# Patient Record
Sex: FEMALE | Race: WHITE | NOT HISPANIC OR LATINO | Employment: FULL TIME | URBAN - METROPOLITAN AREA
[De-identification: names, ages, dates, MRNs, and addresses within clinical notes are randomized per-mention and may not be internally consistent; named-entity substitution may affect disease eponyms.]

---

## 2019-02-19 ENCOUNTER — INITIAL PRENATAL (OUTPATIENT)
Dept: OBSTETRICS AND GYNECOLOGY | Facility: CLINIC | Age: 19
End: 2019-02-19

## 2019-02-19 VITALS — WEIGHT: 129.4 LBS | DIASTOLIC BLOOD PRESSURE: 74 MMHG | SYSTOLIC BLOOD PRESSURE: 109 MMHG

## 2019-02-19 DIAGNOSIS — Z11.3 SCREEN FOR STD (SEXUALLY TRANSMITTED DISEASE): Primary | ICD-10-CM

## 2019-02-19 DIAGNOSIS — Z02.83 ENCOUNTER FOR DRUG SCREENING: ICD-10-CM

## 2019-02-19 DIAGNOSIS — Z13.228 CYSTIC FIBROSIS SCREENING: ICD-10-CM

## 2019-02-19 DIAGNOSIS — Z34.01 PRIMIGRAVIDA IN FIRST TRIMESTER: ICD-10-CM

## 2019-02-19 DIAGNOSIS — Z11.4 SCREENING FOR HIV (HUMAN IMMUNODEFICIENCY VIRUS): ICD-10-CM

## 2019-02-19 PROBLEM — Z34.00 PRIMIGRAVIDA: Status: ACTIVE | Noted: 2019-02-19

## 2019-02-19 LAB
B-HCG UR QL: POSITIVE
INTERNAL NEGATIVE CONTROL: NEGATIVE
INTERNAL POSITIVE CONTROL: POSITIVE
Lab: ABNORMAL

## 2019-02-19 PROCEDURE — 81025 URINE PREGNANCY TEST: CPT | Performed by: OBSTETRICS & GYNECOLOGY

## 2019-02-19 PROCEDURE — 99204 OFFICE O/P NEW MOD 45 MIN: CPT | Performed by: OBSTETRICS & GYNECOLOGY

## 2019-02-19 RX ORDER — VITAMIN A, ASCORBIC ACID, CHOLECALCIFEROL, TOCOPHEROL, THIAMINE MONONITRATE, RIBOFLAVIN, PYRIDOXINE, FOLIC ACID, CYANOCOBALAMIN, CALCIUM CARBONATE, FERROUS FUMARATE, ZINC OXIDE, CUPRIC OXIDE, NIACINAMIDE, AND FISH OIL 27-1-250MG
KIT ORAL
Refills: 3 | COMMUNITY
Start: 2019-02-13 | End: 2019-12-05

## 2019-02-19 NOTE — PROGRESS NOTES
Chief complaint: Initial prenatal visit  History of present illness: Patient is here for her initial prenatal visit.  She did go to the emergency room at [facility] on 1/13/19.  She reported that she was having some pelvic pain at the time.  She states that she had her first positive pregnancy test on 1/11/19.  She has a very uncertain last menstrual period that was possibly sometime around November 2018.  She reports irregular menses secondary to PCOS.  When she went to [facility], and ultrasound did not show any intrauterine pregnancy or extrauterine pregnancy.  Her hCG was only 550 that day.  She has not seen a gynecologist or been back to the hospital since.  She did see her family medicine doctor 2 times, but no blood work was performed.  The patient reports that she is having some mild nausea and morning sickness.  She is not vomiting much.  She says that she has not had any pelvic pain since leaving the hospital that day.  She denies irregular bleeding.  Patient reports that she quit smoking when she found out that she was pregnant.  She denies any drug or alcohol use since learning of the pregnancy.    History reviewed. No pertinent past medical history.     History reviewed. No pertinent surgical history.     Social History     Tobacco Use   • Smoking status: Former Smoker   • Smokeless tobacco: Never Used   Substance Use Topics   • Alcohol use: No     Frequency: Never   • Drug use: No     Family History   Problem Relation Age of Onset   • Hypertension Paternal Uncle      Current Outpatient Medications on File Prior to Visit   Medication Sig   • Prenatal Vit-Fe Fum-FA-Omega (PNV PRENATAL PLUS MULTIVIT+DHA) 27-1 & 312 MG misc TAKE 1 TAB & 1 CAPSULE BY MOUTH ONE TIME A DAY     No current facility-administered medications on file prior to visit.      No Known Allergies     ROS:  General: No fever or chills  Constitutional: No weight loss or gain, no hair loss  HENT: No headache, no hearing loss, no  tinnitus  Eyes: normal vision, no eye pain  Lungs: No cough, no shortness of breath  Heart: No chest pain, no palpitations  Abdomen: Pos nausea, No vomiting, constipation or diarrhea  : No dysuria, no hematuria  Skin: No rashes  Lymph: No swelling  Neuro: No parathesia, no weakness  Psych: Normal though content, no hallucinations, no SI/HI    PE:  Vitals:    19 0918   BP: 109/74   Weight: 58.7 kg (129 lb 6.4 oz)     See initial physical exam in initial pregnancy flowsheet    Assessment:  1.  18-year-old  1 with an uncertain last menstrual period, I would say roughly 8-9 weeks based on exam today.  2 very mild nausea of pregnancy.    Plan:  1. Initial prenatal counseling performed today. Hospital and call coverage system discussed. Pt is recommended to start PNV. Prenatal care educational handouts provided in after visit summary. OTC medications discussed. Discussed typical schedule of prenatal visits. Obtain US next available for dating. ROutine labs today with cultures, and OB profile panel.  2. Discussed nausea and vomiting in pregnancy. Discussed diet and lifestyle modifications to help prevent nausea. Weight gain expectations discussed with pt.  3.  I explained to the patient that I will be leaving the practice beginning 19.  I offered the patient to either continue to follow-up with me until I move or to switch to another doctor in the practice earlier.  Patient states that she is uncertain at this time.  She reports that she was referred to me by a friend.  I explained that I would be happy to see her for as long as I am still practicing with the group, but realistically I would not be here around the time that she would be delivering.  We will schedule the patient with another provider at her next appointment, if she wishes to stay with me until I move we can get her back on my schedule.

## 2019-02-19 NOTE — PATIENT INSTRUCTIONS
Prenatal Care  WHAT IS PRENATAL CARE?  Prenatal care is the process of caring for a pregnant woman before she gives birth. Prenatal care makes sure that she and her baby remain as healthy as possible throughout pregnancy. Prenatal care may be provided by a midwife, family practice health care provider, or a childbirth and pregnancy specialist (obstetrician). Prenatal care may include physical examinations, testing, treatments, and education on nutrition, lifestyle, and social support services.  WHY IS PRENATAL CARE SO IMPORTANT?  Early and consistent prenatal care increases the chance that you and your baby will remain healthy throughout your pregnancy. This type of care also decreases a baby’s risk of being born too early (prematurely), or being born smaller than expected (small for gestational age). Any underlying medical conditions you may have that could pose a risk during your pregnancy are discussed during prenatal care visits. You will also be monitored regularly for any new conditions that may arise during your pregnancy so they can be treated quickly and effectively.  WHAT HAPPENS DURING PRENATAL CARE VISITS?  Prenatal care visits may include the following:  Discussion  Tell your health care provider about any new signs or symptoms you have experienced since your last visit. These might include:  · Nausea or vomiting.  · Increased or decreased level of energy.  · Difficulty sleeping.  · Back or leg pain.  · Weight changes.  · Frequent urination.  · Shortness of breath with physical activity.  · Changes in your skin, such as the development of a rash or itchiness.  · Vaginal discharge or bleeding.  · Feelings of excitement or nervousness.  · Changes in your baby’s movements.    You may want to write down any questions or topics you want to discuss with your health care provider and bring them with you to your appointment.  Examination  During your first prenatal care visit, you will likely have a complete  physical exam. Your health care provider will often examine your vagina, cervix, and the position of your uterus, as well as check your heart, lungs, and other body systems. As your pregnancy progresses, your health care provider will measure the size of your uterus and your baby’s position inside your uterus. He or she may also examine you for early signs of labor. Your prenatal visits may also include checking your blood pressure and, after about 10-12 weeks of pregnancy, listening to your baby’s heartbeat.  Testing  Regular testing often includes:  · Urinalysis. This checks your urine for glucose, protein, or signs of infection.  · Blood count. This checks the levels of white and red blood cells in your body.  · Tests for sexually transmitted infections (STIs). Testing for STIs at the beginning of pregnancy is routinely done and is required in many states.  · Antibody testing. You will be checked to see if you are immune to certain illnesses, such as rubella, that can affect a developing fetus.  · Glucose screen. Around 24-28 weeks of pregnancy, your blood glucose level will be checked for signs of gestational diabetes. Follow-up tests may be recommended.  · Group B strep. This is a bacteria that is commonly found inside a woman’s vagina. This test will inform your health care provider if you need an antibiotic to reduce the amount of this bacteria in your body prior to labor and childbirth.  · Ultrasound. Many pregnant women undergo an ultrasound screening around 18-20 weeks of pregnancy to evaluate the health of the fetus and check for any developmental abnormalities.  · HIV (human immunodeficiency virus) testing. Early in your pregnancy, you will be screened for HIV. If you are at high risk for HIV, this test may be repeated during your third trimester of pregnancy.    You may be offered other testing based on your age, personal or family medical history, or other factors.  HOW OFTEN SHOULD I PLAN TO SEE MY  HEALTH CARE PROVIDER FOR PRENATAL CARE?  Your prenatal care check-up schedule depends on any medical conditions you have before, or develop during, your pregnancy. If you do not have any underlying medical conditions, you will likely be seen for checkups:  · Monthly, during the first 6 months of pregnancy.  · Twice a month during months 7 and 8 of pregnancy.  · Weekly starting in the 9th month of pregnancy and until delivery.    If you develop signs of early labor or other concerning signs or symptoms, you may need to see your health care provider more often. Ask your health care provider what prenatal care schedule is best for you.  WHAT CAN I DO TO KEEP MYSELF AND MY BABY AS HEALTHY AS POSSIBLE DURING MY PREGNANCY?  · Take a prenatal vitamin containing 400 micrograms (0.4 mg) of folic acid every day. Your health care provider may also ask you to take additional vitamins such as iodine, vitamin D, iron, copper, and zinc.  · Take 1085-6182 mg of calcium daily starting at your 20th week of pregnancy until you deliver your baby.  · Make sure you are up to date on your vaccinations. Unless directed otherwise by your health care provider:  ? You should receive a tetanus, diphtheria, and pertussis (Tdap) vaccination between the 27th and 36th week of your pregnancy, regardless of when your last Tdap immunization occurred. This helps protect your baby from whooping cough (pertussis) after he or she is born.  ? You should receive an annual inactivated influenza vaccine (IIV) to help protect you and your baby from influenza. This can be done at any point during your pregnancy.  · Eat a well-rounded diet that includes:  ? Fresh fruits and vegetables.  ? Lean proteins.  ? Calcium-rich foods such as milk, yogurt, hard cheeses, and dark, leafy greens.  ? Whole grain breads.  · Do not eat seafood high in mercury, including:  ? Swordfish.  ? Tilefish.  ? Shark.  ? Hiram mackerel.  ? More than 6 oz tuna per week.  · Do not  eat:  ? Raw or undercooked meats or eggs.  ? Unpasteurized foods, such as soft cheeses (brie, blue, or feta), juices, and milks.  ? Lunch meats.  ? Hot dogs that have not been heated until they are steaming.  · Drink enough water to keep your urine clear or pale yellow. For many women, this may be 10 or more 8 oz glasses of water each day. Keeping yourself hydrated helps deliver nutrients to your baby and may prevent the start of pre-term uterine contractions.  · Do not use any tobacco products including cigarettes, chewing tobacco, or electronic cigarettes. If you need help quitting, ask your health care provider.  · Do not drink beverages containing alcohol. No safe level of alcohol consumption during pregnancy has been determined.  · Do not use any illegal drugs. These can harm your developing baby or cause a miscarriage.  · Ask your health care provider or pharmacist before taking any prescription or over-the-counter medicines, herbs, or supplements.  · Limit your caffeine intake to no more than 200 mg per day.  · Exercise. Unless told otherwise by your health care provider, try to get 30 minutes of moderate exercise most days of the week. Do not  do high-impact activities, contact sports, or activities with a high risk of falling, such as horseback riding or downhill skiing.  · Get plenty of rest.  · Avoid anything that raises your body temperature, such as hot tubs and saunas.  · If you own a cat, do not empty its litter box. Bacteria contained in cat feces can cause an infection called toxoplasmosis. This can result in serious harm to the fetus.  · Stay away from chemicals such as insecticides, lead, mercury, and cleaning or paint products that contain solvents.  · Do not have any X-rays taken unless medically necessary.  · Take a childbirth and breastfeeding preparation class. Ask your health care provider if you need a referral or recommendation.    This information is not intended to replace advice given  to you by your health care provider. Make sure you discuss any questions you have with your health care provider.  Document Released: 12/20/2004 Document Revised: 05/22/2017 Document Reviewed: 03/04/2015  [employer] Interactive Patient Education © 2017 [employer]    Pregnancy, The Father's Role  A father has an important role during his partner’s pregnancy, labor, delivery, and after the birth of the baby. It is important to help and support your partner through this new period. There are many physical and emotional changes that happen. To be helpful and supportive during this time, you should know and understand what is happening to your partner during pregnancy, labor, delivery, and after the baby is born.  What are the stages of pregnancy?  Pregnancy usually lasts about 40 weeks. The pregnancy is divided into three trimesters.  First Trimester  During the first 13 weeks, your partner may:  · Feel tired.  · Have painful breasts.  · Feel nauseous or throw up.  · Urinate more often.  · Have mood changes.    All of these changes are normal. If they are happening, try to be helpful, supportive, and understanding. This may include helping with household duties and activities and spending more time with each other.  Second Trimester  During the next 14-28 weeks:  · Your partner will likely feel better and more energetic.  · This is the best time of the pregnancy to be more active together.  · You will be able to see her belly showing the pregnancy.  · You may be able to feel the baby kick.  · Your partner may have soreness or aching in her back as she gains weight. You can help her by carrying heavy things and by rubbing her back when she is feeling sore.    Third Trimester  During the final 12 weeks, your partner may:  · Become more uncomfortable as the baby grows.  · Have a hard time doing everyday activities, and her balance may be off.  · Have a hard time bending over.  · Tire easily.  · Have difficulty  sleeping.    At this time, the birth of your baby is close. You and your partner may have concerns or questions. This is normal. Talk with each other and with your health care provider. Continue to help your partner with housework, encourage her to rest, and rub her sore back and legs, if this helps her.  What can I expect or do during the pregnancy?  You can expect to experience some changes. There are also many things you can do to help prepare you and your partner for your baby.  Emotional Changes  During your partner's pregnancy, emotional changes for you may include:  · Having feelings of happiness, excitement, and pride.  · Being concerned about having new responsibilities, such as financial or educational responsibilities.  · Feeling overwhelmed or scared.  · Being worried that a baby will change your relationship with your partner.    These feelings are normal. Talk about them openly with your partner and your health care provider.  Prenatal Care  Attend prenatal care visits with your partner. This is a good time for you to get to know your health care provider, follow the pregnancy, and ask questions.  · Prenatal visits usually occur one time each month for six months, then every two weeks for two months, and then one time each week during the last month. You may have more prenatal visits if your health care provider believes this is needed.  · Your health care provider usually does an ultrasound of the baby at one of the prenatal visits. This may happen more often if your health care provider thinks it is needed.    Sexual Activity  Sexual intercourse is safe unless there is a problem with the pregnancy and your health care provider advises you to not have sexual intercourse. Because physical and emotional changes happen in pregnancy, your partner may not want to have sex during certain times. Trying different positions may make sexual intercourse more comfortable. However, always respect your partner’s  decision if she does not want to have sex. It is important for both of you to discuss your feelings and desires. Talk with your health care provider about any questions that you may have about sexual intercourse during pregnancy.  Childbirth Classes  Attend childbirth classes with your partner if you are able. Classes prepare you and help you to understand what happens during labor and delivery, and they help you and your partner to bond. There are even some classes that are only for new fathers. Classes also teach you and your partner:  · Various relaxation techniques.  · How to work with her labor pains.  · How to focus during labor and delivery.    What should I know about labor and delivery?  Many fathers want to be present while their partner is going through labor and delivery. You may:  · Be asked to time the contractions, massage your partner’s back, and breathe with her during the contractions.  · Get to see and enjoy the excitement of your baby being born, and you may even be able to cut your baby’s umbilical cord. If you feel like you might faint or you are uncomfortable, ask someone to help you.  · Need to leave the room if a problem develops during labor or delivery.    A  delivery, or , is a procedure that may be used to deliver the baby. It is done through an incision in the abdomen and the uterus. A  delivery may be scheduled or it may be an emergency procedure during labor and delivery. Most hospitals allow the father to be in the room for a  delivery unless it is an emergency. Recovery from a  delivery usually requires more help from the father.  What happens after delivery?  After your baby is born, your partner will go through many changes again. These changes could last a few months or longer.  Postpartum Depression  Your partner may take awhile to regain her strength. She may also have feelings of sadness (postpartum blues or postpartum depression). If  your partner is acting unusually sad or depressed, talk with your health care provider right away. This can be a serious medical condition that requires treatment.  Breastfeeding  Your partner may decide to breastfeed the baby. This helps with bonding between the mother and the baby, and breast milk is the best nutrition for your baby. You can feel included by burping the baby and bottle-feeding the baby with breast milk that was collected from the mother. This allows your partner to rest and helps you to bond with your baby.  Sexual Activity  It may take a few months for your partner’s body to heal and be ready for sexual intercourse again. This may take longer after a  delivery. If you have any questions about having sexual intercourse or if it is painful for your partner, talk with your health care provider.  It is possible for breastfeeding mothers to become pregnant even if they are not having menstrual periods. Use birth control (contraception) unless you and your partner would like to become pregnant again.  What should I remember?  Fatherhood and having a baby is an ongoing learning experience. It is common to be anxious, concerned, or afraid that you may not be taking care of your  baby properly. It is important to talk with your partner and your health care provider if you are worried or have any questions.  This information is not intended to replace advice given to you by your health care provider. Make sure you discuss any questions you have with your health care provider.  Document Released: 2009 Document Revised: 2017 Document Reviewed: 2015  [employer] Interactive Patient Education © 2017 [employer]

## 2019-02-20 LAB
ABO GROUP BLD: (no result)
BASOPHILS # BLD AUTO: 0 X10E3/UL (ref 0–0.2)
BASOPHILS NFR BLD AUTO: 0 %
BLD GP AB SCN SERPL QL: (no result)
BLD GP AB SCN SERPL QL: POSITIVE
BLOOD GROUP ANTIBODIES SERPL: ABNORMAL
C TRACH RRNA SPEC QL NAA+PROBE: NEGATIVE
EOSINOPHIL # BLD AUTO: 0.1 X10E3/UL (ref 0–0.4)
EOSINOPHIL NFR BLD AUTO: 1 %
ERYTHROCYTE [DISTWIDTH] IN BLOOD BY AUTOMATED COUNT: 12.7 % (ref 12.3–15.4)
HBV SURFACE AG SERPL QL IA: NEGATIVE
HCG INTACT+B SERPL-ACNC: NORMAL MIU/ML
HCT VFR BLD AUTO: 39.2 % (ref 34–46.6)
HCV AB S/CO SERPL IA: <0.1 S/CO RATIO (ref 0–0.9)
HGB BLD-MCNC: 13 G/DL (ref 11.1–15.9)
HIV 1+2 AB+HIV1 P24 AG SERPL QL IA: NON REACTIVE
IMM GRANULOCYTES # BLD AUTO: 0 X10E3/UL (ref 0–0.1)
IMM GRANULOCYTES NFR BLD AUTO: 0 %
LYMPHOCYTES # BLD AUTO: 1.6 X10E3/UL (ref 0.7–3.1)
LYMPHOCYTES NFR BLD AUTO: 23 %
MCH RBC QN AUTO: 30.7 PG (ref 26.6–33)
MCHC RBC AUTO-ENTMCNC: 33.2 G/DL (ref 31.5–35.7)
MCV RBC AUTO: 93 FL (ref 79–97)
MONOCYTES # BLD AUTO: 0.5 X10E3/UL (ref 0.1–0.9)
MONOCYTES NFR BLD AUTO: 7 %
N GONORRHOEA RRNA SPEC QL NAA+PROBE: NEGATIVE
NEUTROPHILS # BLD AUTO: 5 X10E3/UL (ref 1.4–7)
NEUTROPHILS NFR BLD AUTO: 69 %
PLATELET # BLD AUTO: 171 X10E3/UL (ref 150–379)
RBC # BLD AUTO: 4.24 X10E6/UL (ref 3.77–5.28)
RH BLD: NEGATIVE
RPR SER QL: NON REACTIVE
RUBV IGG SERPL IA-ACNC: 1.82 INDEX
T VAGINALIS RRNA VAG QL NAA+PROBE: NEGATIVE
WBC # BLD AUTO: 7.3 X10E3/UL (ref 3.4–10.8)
XXX BLOOD GROUP AB TITR SERPL AHG: ABNORMAL {TITER}

## 2019-02-21 LAB
BACTERIA UR CULT: NO GROWTH
BACTERIA UR CULT: NORMAL

## 2019-02-22 LAB
AMPHETAMINES SERPL QL SCN: NEGATIVE NG/ML
BARBITURATES SERPL QL SCN: NEGATIVE UG/ML
BENZODIAZ SERPL QL SCN: NEGATIVE NG/ML
CANNABINOIDS SERPL QL SCN: NEGATIVE NG/ML
COCAINE+BZE SERPL QL SCN: NEGATIVE NG/ML
METHADONE SERPL QL SCN: NEGATIVE NG/ML
OPIATES SERPL QL SCN: NEGATIVE NG/ML
OXYCODONE+OXYMORPHONE SERPLBLD QL SCN: NEGATIVE NG/ML
PCP SERPL QL SCN: NEGATIVE NG/ML
PROPOXYPH SERPL QL SCN: NEGATIVE NG/ML

## 2019-03-19 ENCOUNTER — PROCEDURE VISIT (OUTPATIENT)
Dept: OBSTETRICS AND GYNECOLOGY | Facility: CLINIC | Age: 19
End: 2019-03-19

## 2019-03-19 ENCOUNTER — ROUTINE PRENATAL (OUTPATIENT)
Dept: OBSTETRICS AND GYNECOLOGY | Facility: CLINIC | Age: 19
End: 2019-03-19

## 2019-03-19 VITALS — SYSTOLIC BLOOD PRESSURE: 108 MMHG | DIASTOLIC BLOOD PRESSURE: 74 MMHG | WEIGHT: 125 LBS

## 2019-03-19 DIAGNOSIS — Z34.02 PRIMIGRAVIDA IN SECOND TRIMESTER: Primary | ICD-10-CM

## 2019-03-19 DIAGNOSIS — Z34.91 UNCERTAIN DATES, ANTEPARTUM, FIRST TRIMESTER: Primary | ICD-10-CM

## 2019-03-19 PROCEDURE — 0502F SUBSEQUENT PRENATAL CARE: CPT | Performed by: OBSTETRICS & GYNECOLOGY

## 2019-03-19 PROCEDURE — 76801 OB US < 14 WKS SINGLE FETUS: CPT | Performed by: OBSTETRICS & GYNECOLOGY

## 2019-03-19 NOTE — PROGRESS NOTES
Patient is here for her routine prenatal visit.  She still has some mild nausea, but states it is greatly improved.  She is otherwise doing well.  No vaginal bleeding.  No pelvic or abdominal pain.  Objective: See physical exam and vital signs and flowsheet  Labs: Prenatal labs reviewed.  Patient had a positive antibody screen, but this was low titer of anti-D and is secondary to the previously administered RhoGam injection.  Ultrasound: Ultrasound today live single intrauterine pregnancy at 13-6/7 weeks gestational age.  Not consistent with dates.  Revised due date 19  Assessment:  1.  18-year-old  1 at 13-6/7 weeks gestational age by today's ultrasound  2.  Mild nausea and vomiting, improving  Plan:  1.  Lab results discussed.  Ultrasound results discussed.  Discussed revised due date.  2.  Expectations for a second trimester pregnancy were discussed with the patient.  Patient had questions about work restrictions.  I feel she may continue to work as normal until at least the third trimester.  3.  Return to the office in 5 weeks for OB follow-up.  Ultrasound for 5 weeks for anatomy.  Patient will switch providers to 1 of the other doctors in the group as I will be leaving the practice.

## 2019-04-25 ENCOUNTER — ROUTINE PRENATAL (OUTPATIENT)
Dept: OBSTETRICS AND GYNECOLOGY | Facility: CLINIC | Age: 19
End: 2019-04-25

## 2019-04-25 VITALS — DIASTOLIC BLOOD PRESSURE: 56 MMHG | SYSTOLIC BLOOD PRESSURE: 100 MMHG | WEIGHT: 125.2 LBS

## 2019-04-25 DIAGNOSIS — Z34.02 ENCOUNTER FOR SUPERVISION OF NORMAL FIRST PREGNANCY IN SECOND TRIMESTER: Primary | ICD-10-CM

## 2019-04-25 PROCEDURE — 0502F SUBSEQUENT PRENATAL CARE: CPT | Performed by: OBSTETRICS & GYNECOLOGY

## 2019-04-25 NOTE — PROGRESS NOTES
[facility] follow-up prenatal visit.  Good fetal movement and growth.  CF and fetal DNA testing refused but wants AFP Tetra today.  Discussed weight limit of 25 to 30 pounds at work at [employer] and note given.  Discussed future ultrasound timing.  Patient will return in 2 weeks with ultrasound screen.

## 2019-04-27 LAB
2ND TRIMESTER 4 SCREEN SERPL-IMP: NORMAL
2ND TRIMESTER 4 SCREEN SERPL-IMP: NORMAL
AFP ADJ MOM SERPL: 1.22
AFP SERPL-MCNC: 67.2 NG/ML
AGE AT DELIVERY: 20.4 YR
FET TS 18 RISK FROM MAT AGE: NORMAL
FET TS 21 RISK FROM MAT AGE: 1158
GA METHOD: NORMAL
GA: 19.1 WEEKS
HCG ADJ MOM SERPL: 1.33
HCG SERPL-ACNC: NORMAL MIU/ML
IDDM PATIENT QL: NO
INHIBIN A ADJ MOM SERPL: 1.01
INHIBIN A SERPL-MCNC: 204.68 PG/ML
LABORATORY COMMENT REPORT: NORMAL
MULTIPLE PREGNANCY: NO
NEURAL TUBE DEFECT RISK FETUS: 6110 %
RESULT: NORMAL
TS 18 RISK FETUS: NORMAL
TS 21 RISK FETUS: NORMAL
U ESTRIOL ADJ MOM SERPL: 1.51
U ESTRIOL SERPL-MCNC: 2.57 NG/ML

## 2019-05-07 ENCOUNTER — TELEPHONE (OUTPATIENT)
Dept: OBSTETRICS AND GYNECOLOGY | Facility: CLINIC | Age: 19
End: 2019-05-07

## 2019-05-07 NOTE — TELEPHONE ENCOUNTER
Ob called had intercourse last night and she is concerned because her vaginal area is swollen and it itches. Pt states no bleeding. Pt would like to know if she needs to do anything.       Thank you

## 2019-05-07 NOTE — TELEPHONE ENCOUNTER
Since it just happened after intercourse is probably from irritation.  I would have her just may be soak in the tub today but if it is getting worse or getting signs of a yeast infection we can obviously treat her.  Hasmukh Gee JP

## 2019-05-14 ENCOUNTER — PROCEDURE VISIT (OUTPATIENT)
Dept: OBSTETRICS AND GYNECOLOGY | Facility: CLINIC | Age: 19
End: 2019-05-14

## 2019-05-14 ENCOUNTER — ROUTINE PRENATAL (OUTPATIENT)
Dept: OBSTETRICS AND GYNECOLOGY | Facility: CLINIC | Age: 19
End: 2019-05-14

## 2019-05-14 VITALS — WEIGHT: 127.4 LBS | DIASTOLIC BLOOD PRESSURE: 64 MMHG | SYSTOLIC BLOOD PRESSURE: 103 MMHG

## 2019-05-14 DIAGNOSIS — Z34.02 ENCOUNTER FOR SUPERVISION OF NORMAL FIRST PREGNANCY IN SECOND TRIMESTER: Primary | ICD-10-CM

## 2019-05-14 DIAGNOSIS — Z36.89 ENCOUNTER FOR FETAL ANATOMIC SURVEY: Primary | ICD-10-CM

## 2019-05-14 PROCEDURE — 76805 OB US >/= 14 WKS SNGL FETUS: CPT | Performed by: OBSTETRICS & GYNECOLOGY

## 2019-05-14 PROCEDURE — 0502F SUBSEQUENT PRENATAL CARE: CPT | Performed by: OBSTETRICS & GYNECOLOGY

## 2019-05-14 NOTE — PROGRESS NOTES
[facility] follow-up prenatal visit.  US today normal screen.  Vertex. . Good fetal movement and growth.  Planning 1 hour blood sugar next visit.  Return to office 4 weeks.

## 2019-05-19 ENCOUNTER — RESULTS ENCOUNTER (OUTPATIENT)
Dept: OBSTETRICS AND GYNECOLOGY | Facility: CLINIC | Age: 19
End: 2019-05-19

## 2019-05-19 DIAGNOSIS — Z34.02 ENCOUNTER FOR SUPERVISION OF NORMAL FIRST PREGNANCY IN SECOND TRIMESTER: ICD-10-CM

## 2019-06-10 ENCOUNTER — TELEPHONE (OUTPATIENT)
Dept: OBSTETRICS AND GYNECOLOGY | Facility: CLINIC | Age: 19
End: 2019-06-10

## 2019-06-10 NOTE — TELEPHONE ENCOUNTER
Patient would like to know if she can have a letter for work. She wants it to say she can work with restrictions cause she said that the heat has been hard on her at work so she would just like for it to say she can't work outside and would like to move to another position inside or out of heat. Is this ok to type up she says she can come pick it up from office tomorrow

## 2019-06-11 NOTE — TELEPHONE ENCOUNTER
Ok to give a letter listing restrictions.  You will have to ask her what exactly what IT needs to say and if it states such that she cannot work IN overly heated areas or lift anything over 25 pounds etc. that is fine.  Any other questions just email me back.  Thank you.  PRINCE

## 2019-06-11 NOTE — TELEPHONE ENCOUNTER
Patient is calling needing a letter listing all of her restrictions for her employer.       Thank you

## 2019-06-13 ENCOUNTER — ROUTINE PRENATAL (OUTPATIENT)
Dept: OBSTETRICS AND GYNECOLOGY | Facility: CLINIC | Age: 19
End: 2019-06-13

## 2019-06-13 VITALS — DIASTOLIC BLOOD PRESSURE: 64 MMHG | WEIGHT: 136.4 LBS | SYSTOLIC BLOOD PRESSURE: 110 MMHG

## 2019-06-13 DIAGNOSIS — Z34.02 ENCOUNTER FOR SUPERVISION OF NORMAL FIRST PREGNANCY IN SECOND TRIMESTER: Primary | ICD-10-CM

## 2019-06-13 PROCEDURE — 0502F SUBSEQUENT PRENATAL CARE: CPT | Performed by: OBSTETRICS & GYNECOLOGY

## 2019-06-13 NOTE — PROGRESS NOTES
CC follow-up prenatal visit.  1 hour glucose today.  O- blood type.  Already received RhoGam early in her pregnancy but will receive it again in 3 weeks.  No current problems except for some ear pressure for which she will try Sudafed before seeing her PCP.  Good fetal movement and growth.  Return to office 3 weeks.

## 2019-06-14 LAB
BASOPHILS # BLD AUTO: 0.06 10*3/MM3 (ref 0–0.2)
BASOPHILS NFR BLD AUTO: 0.5 % (ref 0–1.5)
BLD GP AB SCN SERPL QL: NEGATIVE
EOSINOPHIL # BLD AUTO: 0.11 10*3/MM3 (ref 0–0.4)
EOSINOPHIL NFR BLD AUTO: 0.9 % (ref 0.3–6.2)
ERYTHROCYTE [DISTWIDTH] IN BLOOD BY AUTOMATED COUNT: 12.6 % (ref 12.3–15.4)
GLUCOSE 1H P 50 G GLC PO SERPL-MCNC: 86 MG/DL (ref 65–139)
HCT VFR BLD AUTO: 34.6 % (ref 34–46.6)
HGB BLD-MCNC: 10.8 G/DL (ref 12–15.9)
IMM GRANULOCYTES # BLD AUTO: 0.41 10*3/MM3 (ref 0–0.05)
IMM GRANULOCYTES NFR BLD AUTO: 3.3 % (ref 0–0.5)
LYMPHOCYTES # BLD AUTO: 1.7 10*3/MM3 (ref 0.7–3.1)
LYMPHOCYTES NFR BLD AUTO: 13.5 % (ref 19.6–45.3)
MCH RBC QN AUTO: 31.7 PG (ref 26.6–33)
MCHC RBC AUTO-ENTMCNC: 31.2 G/DL (ref 31.5–35.7)
MCV RBC AUTO: 101.5 FL (ref 79–97)
MONOCYTES # BLD AUTO: 0.64 10*3/MM3 (ref 0.1–0.9)
MONOCYTES NFR BLD AUTO: 5.1 % (ref 5–12)
NEUTROPHILS # BLD AUTO: 9.67 10*3/MM3 (ref 1.7–7)
NEUTROPHILS NFR BLD AUTO: 76.7 % (ref 42.7–76)
NRBC BLD AUTO-RTO: 0 /100 WBC (ref 0–0.2)
PLATELET # BLD AUTO: 160 10*3/MM3 (ref 140–450)
RBC # BLD AUTO: 3.41 10*6/MM3 (ref 3.77–5.28)
WBC # BLD AUTO: 12.59 10*3/MM3 (ref 3.4–10.8)

## 2019-06-23 ENCOUNTER — HOSPITAL ENCOUNTER (EMERGENCY)
Facility: HOSPITAL | Age: 19
Discharge: HOME | End: 2019-06-24
Attending: EMERGENCY MEDICINE | Admitting: EMERGENCY MEDICINE

## 2019-06-23 DIAGNOSIS — R11.2 NON-INTRACTABLE VOMITING WITH NAUSEA, UNSPECIFIED VOMITING TYPE: ICD-10-CM

## 2019-06-23 DIAGNOSIS — J20.9 ACUTE BRONCHITIS, UNSPECIFIED ORGANISM: Primary | ICD-10-CM

## 2019-06-23 PROCEDURE — 83690 ASSAY OF LIPASE: CPT | Performed by: PHYSICIAN ASSISTANT

## 2019-06-23 PROCEDURE — 96374 THER/PROPH/DIAG INJ IV PUSH: CPT

## 2019-06-23 PROCEDURE — 80053 COMPREHEN METABOLIC PANEL: CPT | Performed by: PHYSICIAN ASSISTANT

## 2019-06-23 PROCEDURE — 25010000002 ONDANSETRON PER 1 MG: Performed by: EMERGENCY MEDICINE

## 2019-06-23 PROCEDURE — 85025 COMPLETE CBC W/AUTO DIFF WBC: CPT | Performed by: PHYSICIAN ASSISTANT

## 2019-06-23 PROCEDURE — 99284 EMERGENCY DEPT VISIT MOD MDM: CPT

## 2019-06-23 RX ORDER — ONDANSETRON 2 MG/ML
4 INJECTION INTRAMUSCULAR; INTRAVENOUS ONCE
Status: COMPLETED | OUTPATIENT
Start: 2019-06-23 | End: 2019-06-23

## 2019-06-23 RX ADMIN — SODIUM CHLORIDE 1000 ML: 9 INJECTION, SOLUTION INTRAVENOUS at 23:33

## 2019-06-23 RX ADMIN — ONDANSETRON HYDROCHLORIDE 4 MG: 2 SOLUTION INTRAMUSCULAR; INTRAVENOUS at 23:46

## 2019-06-24 VITALS
BODY MASS INDEX: 21.85 KG/M2 | SYSTOLIC BLOOD PRESSURE: 113 MMHG | HEART RATE: 103 BPM | RESPIRATION RATE: 16 BRPM | HEIGHT: 67 IN | WEIGHT: 139.2 LBS | DIASTOLIC BLOOD PRESSURE: 60 MMHG | TEMPERATURE: 99 F | OXYGEN SATURATION: 98 %

## 2019-06-24 LAB
ALBUMIN SERPL-MCNC: 3.6 G/DL (ref 3.5–5.2)
ALBUMIN/GLOB SERPL: 1.3 G/DL
ALP SERPL-CCNC: 82 U/L (ref 39–117)
ALT SERPL W P-5'-P-CCNC: 15 U/L (ref 1–33)
ANION GAP SERPL CALCULATED.3IONS-SCNC: 9.9 MMOL/L
AST SERPL-CCNC: 16 U/L (ref 1–32)
BASOPHILS # BLD AUTO: 0.03 10*3/MM3 (ref 0–0.2)
BASOPHILS NFR BLD AUTO: 0.2 % (ref 0–1.5)
BILIRUB SERPL-MCNC: <0.2 MG/DL (ref 0.2–1.2)
BILIRUB UR QL STRIP: NEGATIVE
BUN BLD-MCNC: 5 MG/DL (ref 6–20)
BUN/CREAT SERPL: 12.2 (ref 7–25)
CALCIUM SPEC-SCNC: 8.7 MG/DL (ref 8.6–10.5)
CHLORIDE SERPL-SCNC: 105 MMOL/L (ref 98–107)
CLARITY UR: CLEAR
CO2 SERPL-SCNC: 22.1 MMOL/L (ref 22–29)
COLOR UR: YELLOW
CREAT BLD-MCNC: 0.41 MG/DL (ref 0.57–1)
DEPRECATED RDW RBC AUTO: 41.9 FL (ref 37–54)
EOSINOPHIL # BLD AUTO: 0.07 10*3/MM3 (ref 0–0.4)
EOSINOPHIL NFR BLD AUTO: 0.6 % (ref 0.3–6.2)
ERYTHROCYTE [DISTWIDTH] IN BLOOD BY AUTOMATED COUNT: 12 % (ref 12.3–15.4)
GFR SERPL CREATININE-BSD FRML MDRD: >150 ML/MIN/1.73
GLOBULIN UR ELPH-MCNC: 2.8 GM/DL
GLUCOSE BLD-MCNC: 108 MG/DL (ref 65–99)
GLUCOSE UR STRIP-MCNC: NEGATIVE MG/DL
HCT VFR BLD AUTO: 32.6 % (ref 34–46.6)
HGB BLD-MCNC: 10.8 G/DL (ref 12–15.9)
HGB UR QL STRIP.AUTO: NEGATIVE
IMM GRANULOCYTES # BLD AUTO: 0.36 10*3/MM3 (ref 0–0.05)
IMM GRANULOCYTES NFR BLD AUTO: 2.9 % (ref 0–0.5)
KETONES UR QL STRIP: NEGATIVE
LEUKOCYTE ESTERASE UR QL STRIP.AUTO: NEGATIVE
LIPASE SERPL-CCNC: 38 U/L (ref 13–60)
LYMPHOCYTES # BLD AUTO: 1.93 10*3/MM3 (ref 0.7–3.1)
LYMPHOCYTES NFR BLD AUTO: 15.8 % (ref 19.6–45.3)
MCH RBC QN AUTO: 31.9 PG (ref 26.6–33)
MCHC RBC AUTO-ENTMCNC: 33.1 G/DL (ref 31.5–35.7)
MCV RBC AUTO: 96.2 FL (ref 79–97)
MONOCYTES # BLD AUTO: 0.72 10*3/MM3 (ref 0.1–0.9)
MONOCYTES NFR BLD AUTO: 5.9 % (ref 5–12)
NEUTROPHILS # BLD AUTO: 9.11 10*3/MM3 (ref 1.7–7)
NEUTROPHILS NFR BLD AUTO: 74.6 % (ref 42.7–76)
NITRITE UR QL STRIP: NEGATIVE
NRBC BLD AUTO-RTO: 0 /100 WBC (ref 0–0.2)
PH UR STRIP.AUTO: 7 [PH] (ref 5–8)
PLATELET # BLD AUTO: 144 10*3/MM3 (ref 140–450)
PMV BLD AUTO: 11.2 FL (ref 6–12)
POTASSIUM BLD-SCNC: 3.8 MMOL/L (ref 3.5–5.2)
PROT SERPL-MCNC: 6.4 G/DL (ref 6–8.5)
PROT UR QL STRIP: NEGATIVE
RBC # BLD AUTO: 3.39 10*6/MM3 (ref 3.77–5.28)
SODIUM BLD-SCNC: 137 MMOL/L (ref 136–145)
SP GR UR STRIP: 1.01 (ref 1–1.03)
UROBILINOGEN UR QL STRIP: NORMAL
WBC NRBC COR # BLD: 12.22 10*3/MM3 (ref 3.4–10.8)

## 2019-06-24 PROCEDURE — 81003 URINALYSIS AUTO W/O SCOPE: CPT | Performed by: PHYSICIAN ASSISTANT

## 2019-06-24 RX ORDER — AMOXICILLIN 500 MG/1
1000 CAPSULE ORAL 3 TIMES DAILY
Qty: 21 CAPSULE | Refills: 0 | Status: SHIPPED | OUTPATIENT
Start: 2019-06-24 | End: 2019-07-19 | Stop reason: HOSPADM

## 2019-06-26 ENCOUNTER — HOSPITAL ENCOUNTER (EMERGENCY)
Facility: HOSPITAL | Age: 19
End: 2019-06-26

## 2019-06-26 ENCOUNTER — HOSPITAL ENCOUNTER (OUTPATIENT)
Facility: HOSPITAL | Age: 19
Setting detail: OBSERVATION
Discharge: HOME | End: 2019-06-26
Attending: OBSTETRICS & GYNECOLOGY | Admitting: OBSTETRICS & GYNECOLOGY

## 2019-06-26 VITALS
WEIGHT: 134.6 LBS | HEART RATE: 86 BPM | DIASTOLIC BLOOD PRESSURE: 60 MMHG | SYSTOLIC BLOOD PRESSURE: 109 MMHG | TEMPERATURE: 97.8 F | BODY MASS INDEX: 21.08 KG/M2 | RESPIRATION RATE: 16 BRPM

## 2019-06-26 PROBLEM — Z34.90 PREGNANCY: Status: ACTIVE | Noted: 2019-06-26

## 2019-06-26 LAB
BILIRUB UR QL STRIP: NEGATIVE
CLARITY UR: CLEAR
COLOR UR: YELLOW
GLUCOSE UR STRIP-MCNC: NEGATIVE MG/DL
HGB UR QL STRIP.AUTO: NEGATIVE
KETONES UR QL STRIP: NEGATIVE
LEUKOCYTE ESTERASE UR QL STRIP.AUTO: NEGATIVE
NITRITE UR QL STRIP: NEGATIVE
PH UR STRIP.AUTO: 7 [PH] (ref 5–8)
PROT UR QL STRIP: NEGATIVE
SP GR UR STRIP: 1.01 (ref 1–1.03)
UROBILINOGEN UR QL STRIP: NORMAL

## 2019-06-26 PROCEDURE — 59025 FETAL NON-STRESS TEST: CPT

## 2019-06-26 PROCEDURE — 81003 URINALYSIS AUTO W/O SCOPE: CPT | Performed by: OBSTETRICS & GYNECOLOGY

## 2019-06-26 PROCEDURE — 59025 FETAL NON-STRESS TEST: CPT | Performed by: OBSTETRICS & GYNECOLOGY

## 2019-06-26 PROCEDURE — G0378 HOSPITAL OBSERVATION PER HR: HCPCS

## 2019-07-08 ENCOUNTER — ROUTINE PRENATAL (OUTPATIENT)
Dept: OBSTETRICS AND GYNECOLOGY | Facility: CLINIC | Age: 19
End: 2019-07-08

## 2019-07-08 VITALS — BODY MASS INDEX: 20.67 KG/M2 | DIASTOLIC BLOOD PRESSURE: 71 MMHG | SYSTOLIC BLOOD PRESSURE: 107 MMHG | WEIGHT: 132 LBS

## 2019-07-08 DIAGNOSIS — Z34.03 ENCOUNTER FOR SUPERVISION OF NORMAL FIRST PREGNANCY IN THIRD TRIMESTER: Primary | ICD-10-CM

## 2019-07-08 PROCEDURE — 0502F SUBSEQUENT PRENATAL CARE: CPT | Performed by: OBSTETRICS & GYNECOLOGY

## 2019-07-08 PROCEDURE — 96372 THER/PROPH/DIAG INJ SC/IM: CPT | Performed by: OBSTETRICS & GYNECOLOGY

## 2019-07-08 RX ORDER — LANOLIN ALCOHOL/MO/W.PET/CERES
325 CREAM (GRAM) TOPICAL 2 TIMES DAILY WITH MEALS
Qty: 60 TABLET | Refills: 11
Start: 2019-07-08 | End: 2019-12-05

## 2019-07-08 NOTE — PROGRESS NOTES
CC prenatal f/u.  1 hour glucose normal.  Receiving RhoGam again today.  Started on iron sulfate daily.  Good fetal movement and growth.  Possible weight loss but will recheck it in 2 weeks.  Encouraged to eat more.  Return to office in 2 weeks.

## 2019-07-16 ENCOUNTER — HOSPITAL ENCOUNTER (INPATIENT)
Facility: HOSPITAL | Age: 19
LOS: 3 days | Discharge: HOME | End: 2019-07-19
Attending: OBSTETRICS & GYNECOLOGY | Admitting: OBSTETRICS & GYNECOLOGY

## 2019-07-16 ENCOUNTER — TELEPHONE (OUTPATIENT)
Dept: OBSTETRICS AND GYNECOLOGY | Facility: CLINIC | Age: 19
End: 2019-07-16

## 2019-07-16 PROBLEM — O60.00 PRETERM LABOR: Status: ACTIVE | Noted: 2019-07-16

## 2019-07-16 LAB
ABO GROUP BLD: NORMAL
AMPHET+METHAMPHET UR QL: NEGATIVE
BARBITURATES UR QL SCN: NEGATIVE
BASOPHILS # BLD AUTO: 0.04 10*3/MM3 (ref 0–0.2)
BASOPHILS NFR BLD AUTO: 0.3 % (ref 0–1.5)
BENZODIAZ UR QL SCN: NEGATIVE
BILIRUB UR QL STRIP: NEGATIVE
BLD GP AB SCN SERPL QL: POSITIVE
BLOODY SPECIMEN?: NO
CANNABINOIDS SERPL QL: NEGATIVE
CLARITY UR: CLEAR
COCAINE UR QL: NEGATIVE
COLOR UR: YELLOW
DEPRECATED RDW RBC AUTO: 41.9 FL (ref 37–54)
EOSINOPHIL # BLD AUTO: 0.07 10*3/MM3 (ref 0–0.4)
EOSINOPHIL NFR BLD AUTO: 0.5 % (ref 0.3–6.2)
ERYTHROCYTE [DISTWIDTH] IN BLOOD BY AUTOMATED COUNT: 12.1 % (ref 12.3–15.4)
FIBRONECTIN FETAL VAG QL: POSITIVE
GLUCOSE UR STRIP-MCNC: NEGATIVE MG/DL
HCT VFR BLD AUTO: 32.2 % (ref 34–46.6)
HGB BLD-MCNC: 10.8 G/DL (ref 12–15.9)
HGB UR QL STRIP.AUTO: NEGATIVE
IMM GRANULOCYTES # BLD AUTO: 0.35 10*3/MM3 (ref 0–0.05)
IMM GRANULOCYTES NFR BLD AUTO: 2.7 % (ref 0–0.5)
KETONES UR QL STRIP: ABNORMAL
LEUKOCYTE ESTERASE UR QL STRIP.AUTO: NEGATIVE
LYMPHOCYTES # BLD AUTO: 2.38 10*3/MM3 (ref 0.7–3.1)
LYMPHOCYTES NFR BLD AUTO: 18.1 % (ref 19.6–45.3)
MCH RBC QN AUTO: 32 PG (ref 26.6–33)
MCHC RBC AUTO-ENTMCNC: 33.5 G/DL (ref 31.5–35.7)
MCV RBC AUTO: 95.3 FL (ref 79–97)
METHADONE UR QL SCN: NEGATIVE
MONOCYTES # BLD AUTO: 0.67 10*3/MM3 (ref 0.1–0.9)
MONOCYTES NFR BLD AUTO: 5.1 % (ref 5–12)
NEUTROPHILS # BLD AUTO: 9.63 10*3/MM3 (ref 1.7–7)
NEUTROPHILS NFR BLD AUTO: 73.3 % (ref 42.7–76)
NITRITE UR QL STRIP: NEGATIVE
NRBC BLD AUTO-RTO: 0 /100 WBC (ref 0–0.2)
OPIATES UR QL: NEGATIVE
OXYCODONE UR QL SCN: NEGATIVE
PH UR STRIP.AUTO: 6.5 [PH] (ref 5–8)
PLATELET # BLD AUTO: 146 10*3/MM3 (ref 140–450)
PMV BLD AUTO: 11.1 FL (ref 6–12)
PROT UR QL STRIP: NEGATIVE
RBC # BLD AUTO: 3.38 10*6/MM3 (ref 3.77–5.28)
RESIDUAL RHIG DETECTED: NORMAL
RH BLD: NEGATIVE
SP GR UR STRIP: <=1.005 (ref 1–1.03)
T&S EXPIRATION DATE: NORMAL
UROBILINOGEN UR QL STRIP: ABNORMAL
WBC NRBC COR # BLD: 13.14 10*3/MM3 (ref 3.4–10.8)

## 2019-07-16 PROCEDURE — 80307 DRUG TEST PRSMV CHEM ANLYZR: CPT | Performed by: OBSTETRICS & GYNECOLOGY

## 2019-07-16 PROCEDURE — 99219 PR INITIAL OBSERVATION CARE/DAY 50 MINUTES: CPT | Performed by: OBSTETRICS & GYNECOLOGY

## 2019-07-16 PROCEDURE — G0378 HOSPITAL OBSERVATION PER HR: HCPCS

## 2019-07-16 PROCEDURE — 86870 RBC ANTIBODY IDENTIFICATION: CPT | Performed by: OBSTETRICS & GYNECOLOGY

## 2019-07-16 PROCEDURE — 81003 URINALYSIS AUTO W/O SCOPE: CPT | Performed by: OBSTETRICS & GYNECOLOGY

## 2019-07-16 PROCEDURE — 25010000002 BETAMETHASONE ACET & SOD PHOS PER 4 MG: Performed by: OBSTETRICS & GYNECOLOGY

## 2019-07-16 PROCEDURE — 86900 BLOOD TYPING SEROLOGIC ABO: CPT | Performed by: OBSTETRICS & GYNECOLOGY

## 2019-07-16 PROCEDURE — 82731 ASSAY OF FETAL FIBRONECTIN: CPT | Performed by: OBSTETRICS & GYNECOLOGY

## 2019-07-16 PROCEDURE — 86850 RBC ANTIBODY SCREEN: CPT | Performed by: OBSTETRICS & GYNECOLOGY

## 2019-07-16 PROCEDURE — 85025 COMPLETE CBC W/AUTO DIFF WBC: CPT | Performed by: OBSTETRICS & GYNECOLOGY

## 2019-07-16 PROCEDURE — 86901 BLOOD TYPING SEROLOGIC RH(D): CPT | Performed by: OBSTETRICS & GYNECOLOGY

## 2019-07-16 RX ORDER — MAGNESIUM SULFATE 1 G/100ML
3 INJECTION INTRAVENOUS CONTINUOUS
Status: DISCONTINUED | OUTPATIENT
Start: 2019-07-16 | End: 2019-07-18 | Stop reason: SDUPTHER

## 2019-07-16 RX ORDER — SODIUM CHLORIDE 0.9 % (FLUSH) 0.9 %
3 SYRINGE (ML) INJECTION EVERY 12 HOURS SCHEDULED
Status: DISCONTINUED | OUTPATIENT
Start: 2019-07-16 | End: 2019-07-19 | Stop reason: HOSPADM

## 2019-07-16 RX ORDER — BETAMETHASONE SODIUM PHOSPHATE AND BETAMETHASONE ACETATE 3; 3 MG/ML; MG/ML
12 INJECTION, SUSPENSION INTRA-ARTICULAR; INTRALESIONAL; INTRAMUSCULAR; SOFT TISSUE EVERY 24 HOURS
Status: COMPLETED | OUTPATIENT
Start: 2019-07-16 | End: 2019-07-17

## 2019-07-16 RX ORDER — MAGNESIUM SULFATE HEPTAHYDRATE 40 MG/ML
2 INJECTION, SOLUTION INTRAVENOUS CONTINUOUS
Status: DISCONTINUED | OUTPATIENT
Start: 2019-07-16 | End: 2019-07-19 | Stop reason: HOSPADM

## 2019-07-16 RX ORDER — SODIUM CHLORIDE, SODIUM LACTATE, POTASSIUM CHLORIDE, CALCIUM CHLORIDE 600; 310; 30; 20 MG/100ML; MG/100ML; MG/100ML; MG/100ML
75 INJECTION, SOLUTION INTRAVENOUS CONTINUOUS
Status: DISCONTINUED | OUTPATIENT
Start: 2019-07-16 | End: 2019-07-19 | Stop reason: HOSPADM

## 2019-07-16 RX ORDER — MAGNESIUM SULFATE 1 G/100ML
3 INJECTION INTRAVENOUS CONTINUOUS
Status: DISCONTINUED | OUTPATIENT
Start: 2019-07-16 | End: 2019-07-16

## 2019-07-16 RX ORDER — SODIUM CHLORIDE 0.9 % (FLUSH) 0.9 %
3-10 SYRINGE (ML) INJECTION AS NEEDED
Status: DISCONTINUED | OUTPATIENT
Start: 2019-07-16 | End: 2019-07-19 | Stop reason: HOSPADM

## 2019-07-16 RX ADMIN — SODIUM CHLORIDE, POTASSIUM CHLORIDE, SODIUM LACTATE AND CALCIUM CHLORIDE 75 ML/HR: 600; 310; 30; 20 INJECTION, SOLUTION INTRAVENOUS at 18:59

## 2019-07-16 RX ADMIN — BETAMETHASONE SODIUM PHOSPHATE AND BETAMETHASONE ACETATE 12 MG: 3; 3 INJECTION, SUSPENSION INTRA-ARTICULAR; INTRALESIONAL; INTRAMUSCULAR at 19:13

## 2019-07-16 NOTE — TELEPHONE ENCOUNTER
Pt called and stated she is experiencing bad cramping in her lower abdomin. She is also getting light headed. Please advise.       Thank you

## 2019-07-16 NOTE — H&P
Patient Care Team:  Angie Morton APRN as PCP - General (Nurse Practitioner)    Chief complaint - cramping and pelvic pressure    Subjective     History of Present Illness - Patient is a 19 year old  at 30+ weeks who presents to labor and delivery with complaints of increased cramping and pressure today.  Patient denies bleeding or spotting.  She had an episode of cramping three weeks ago and presented to labor and delivery.  She was evaluated and  labor was ruled out and she was sent home.  After having increasing and progressive symptoms today, she notified her doctor who recommended that she present to labor and delivery.  She reports good FM.  She has been hydrating well and has had no recent sexual activity.    Review of Systems   Constitutional: Negative for chills and fever.   HENT: Negative for dental problem and drooling.    Eyes: Negative for photophobia and visual disturbance.   Respiratory: Negative for shortness of breath and wheezing.    Cardiovascular: Negative for chest pain and palpitations.   Gastrointestinal: Negative for nausea and vomiting.   Genitourinary: Negative for dysuria and frequency.   Musculoskeletal: Negative for gait problem and joint swelling.   Skin: Negative for pallor and rash.   Neurological: Negative for seizures and speech difficulty.   Hematological: Negative for adenopathy. Does not bruise/bleed easily.   Psychiatric/Behavioral: Negative for behavioral problems and confusion.        Past Medical History:   Diagnosis Date   • Deliberate self-cutting     Pt has old scars on both legs; no problems since 15     History reviewed. No pertinent surgical history.  Family History   Problem Relation Age of Onset   • Hypertension Paternal Uncle      Social History     Tobacco Use   • Smoking status: Former Smoker     Types: Cigarettes   • Smokeless tobacco: Never Used   Substance Use Topics   • Alcohol use: No     Frequency: Never   • Drug use: No     Medications  Prior to Admission   Medication Sig Dispense Refill Last Dose   • ferrous sulfate 325 (65 FE) MG EC tablet Take 1 tablet by mouth 2 (Two) Times a Day With Meals. 60 tablet 11 7/15/2019 at 2000   • Prenatal Vit-Fe Fum-FA-Omega (PNV PRENATAL PLUS MULTIVIT+DHA) 27-1 & 312 MG misc TAKE 1 TAB & 1 CAPSULE BY MOUTH ONE TIME A DAY  3 7/15/2019 at 2000   • amoxicillin (AMOXIL) 500 MG capsule Take 2 capsules by mouth 3 (Three) Times a Day. 21 capsule 0 2019 at 2200     Allergies:  Patient has no known allergies.    Objective      Vital Signs  Temp:  [98.2 °F (36.8 °C)] 98.2 °F (36.8 °C)  Heart Rate:  [88] 88  Resp:  [17] 17  BP: (112)/(57) 112/57    Physical Exam   Constitutional: She appears well-developed and well-nourished.   HENT:   Right Ear: External ear normal.   Left Ear: External ear normal.   Nose: Nose normal.   Eyes: Conjunctivae are normal.   Neck: Normal range of motion. Neck supple. No thyromegaly present.   Cardiovascular: Normal rate.   Pulmonary/Chest: Effort normal. No stridor.   Abdominal: Soft. There is no tenderness. There is no guarding.   Genitourinary:   Genitourinary Comments: Cervix 60/1/-3   Musculoskeletal: Normal range of motion.   Neurological: She is alert. Coordination normal.   Skin: Skin is warm and dry.   Psychiatric: She has a normal mood and affect. Her behavior is normal. Judgment and thought content normal.   Vitals reviewed.      Results Review:   Urine tox, urinalysis negative.              Fetal Fibronectin positive.    NST - Category 1.  [place] with regular uterine contractions.      Assessment/Plan       Pregnancy     labor      Assessment & Plan  IUP at 30+ weeks with  labor  - Patient with cervical dilation and regular contractions.  Her FFN testing was also positive.  Her risks of  labor/delivery are increased.  I recommended magnesium sulfate for tocolysis with betamethasone administration for fetal lung maturity.  Will monitor closely.  Likely sonogram  in morning for cervical length and growth.  - Tobacco use.  Discussed with patient that smoking impedes placental function which increases risks of complications including  labor and birth, IUGR, stillbirth and SGA.  Patient strongly advised to quit smoking for the remainder of her pregnancy.    I discussed the patients findings and my recommendations with patient and family    Hernesto Sandoval MD  19  6:48 PM    Time: More than 50% of time spent in counseling and coordination of care:  Total face-to-face/floor time 50 min.  Time spent in counseling 25 min. Counseling included the following topics: management of  labor, risks for progression and delivery and counseling on smoking cessation.

## 2019-07-16 NOTE — TELEPHONE ENCOUNTER
Patient is only 30 weeks and therefore needs to go to labor and delivery now to be checked and monitored for  labor.  Thank you.  PRINCE

## 2019-07-17 ENCOUNTER — APPOINTMENT (OUTPATIENT)
Dept: ULTRASOUND IMAGING | Facility: HOSPITAL | Age: 19
End: 2019-07-17

## 2019-07-17 LAB — MAGNESIUM SERPL-MCNC: 7.4 MG/DL (ref 1.7–2.2)

## 2019-07-17 PROCEDURE — 59025 FETAL NON-STRESS TEST: CPT

## 2019-07-17 PROCEDURE — 83735 ASSAY OF MAGNESIUM: CPT | Performed by: OBSTETRICS & GYNECOLOGY

## 2019-07-17 PROCEDURE — 99225 PR SBSQ OBSERVATION CARE/DAY 25 MINUTES: CPT | Performed by: OBSTETRICS & GYNECOLOGY

## 2019-07-17 PROCEDURE — 76811 OB US DETAILED SNGL FETUS: CPT

## 2019-07-17 PROCEDURE — G0378 HOSPITAL OBSERVATION PER HR: HCPCS

## 2019-07-17 PROCEDURE — 76817 TRANSVAGINAL US OBSTETRIC: CPT

## 2019-07-17 PROCEDURE — 25010000002 BETAMETHASONE ACET & SOD PHOS PER 4 MG: Performed by: OBSTETRICS & GYNECOLOGY

## 2019-07-17 PROCEDURE — 87081 CULTURE SCREEN ONLY: CPT | Performed by: OBSTETRICS & GYNECOLOGY

## 2019-07-17 RX ORDER — IBUPROFEN 600 MG/1
600 TABLET ORAL ONCE
Status: COMPLETED | OUTPATIENT
Start: 2019-07-17 | End: 2019-07-17

## 2019-07-17 RX ORDER — IBUPROFEN 400 MG/1
400 TABLET ORAL 2 TIMES DAILY
Status: COMPLETED | OUTPATIENT
Start: 2019-07-17 | End: 2019-07-19

## 2019-07-17 RX ADMIN — BETAMETHASONE SODIUM PHOSPHATE AND BETAMETHASONE ACETATE 12 MG: 3; 3 INJECTION, SUSPENSION INTRA-ARTICULAR; INTRALESIONAL; INTRAMUSCULAR at 19:34

## 2019-07-17 RX ADMIN — IBUPROFEN 600 MG: 600 TABLET ORAL at 12:54

## 2019-07-17 RX ADMIN — MAGNESIUM SULFATE HEPTAHYDRATE 3 G/HR: 40 INJECTION, SOLUTION INTRAVENOUS at 08:25

## 2019-07-17 RX ADMIN — IBUPROFEN 400 MG: 400 TABLET, FILM COATED ORAL at 19:32

## 2019-07-17 RX ADMIN — MAGNESIUM SULFATE HEPTAHYDRATE 3 G/HR: 40 INJECTION, SOLUTION INTRAVENOUS at 01:25

## 2019-07-17 RX ADMIN — SODIUM CHLORIDE, PRESERVATIVE FREE 3 ML: 5 INJECTION INTRAVENOUS at 20:02

## 2019-07-17 NOTE — PLAN OF CARE
Problem: Patient Care Overview  Goal: Plan of Care Review  Outcome: Ongoing (interventions implemented as appropriate)   19   Coping/Psychosocial   Plan of Care Reviewed With patient;significant other   Plan of Care Review   Progress improving   OTHER   Outcome Summary pt is currently resting comfortably and has not felt any ctx for the past few hours.      Goal: Individualization and Mutuality  Outcome: Ongoing (interventions implemented as appropriate)   19   Individualization   Patient Specific Goals (Include Timeframe) to stop harsh and remain pregnant   Patient Specific Interventions mag sulfate   Mutuality/Individual Preferences   What Anxieties, Fears, Concerns, or Questions Do You Have About Your Care? nervous about having an early baby    How Would You and/or Your Support Person Like to Participate in Your Care? be present at all times and include in POC.    Mutuality/Individual Preferences   How to Address Anxieties/Fears explain what is going on and keep informed     Goal: Discharge Needs Assessment  Outcome: Ongoing (interventions implemented as appropriate)   19   Discharge Needs Assessment   Readmission Within the Last 30 Days no previous admission in last 30 days   Concerns to be Addressed no discharge needs identified   Patient/Family Anticipates Transition to home;home with family   Patient/Family Anticipated Services at Transition none   Transportation Anticipated car, drives self;family or friend will provide   Anticipated Changes Related to Illness none   Equipment Needed After Discharge none   Disability   Equipment Currently Used at Home none       Problem:  Labor (Adult,Obstetrics,Pediatric)  Goal: Signs and Symptoms of Listed Potential Problems Will be Absent, Minimized or Managed ( Labor)  Outcome: Ongoing (interventions implemented as appropriate)   19   Goal/Outcome Evaluation   Problems Assessed ( Labor) all   Problems  Present ( Labor) other (see comments)  (contractions)

## 2019-07-17 NOTE — PLAN OF CARE
Problem: Patient Care Overview  Goal: Plan of Care Review  Outcome: Ongoing (interventions implemented as appropriate)   19 1755   OTHER   Outcome Summary Pt off MgSO4 and on antepartum. Denies LOF, VB, ctx. Reports good FM. Resting comfortably.     Goal: Individualization and Mutuality  Outcome: Ongoing (interventions implemented as appropriate)   19 0630   Individualization   Patient Specific Goals (Include Timeframe) to stop harsh and remain pregnant   Patient Specific Interventions mag sulfate   Mutuality/Individual Preferences   What Anxieties, Fears, Concerns, or Questions Do You Have About Your Care? nervous about having an early baby    How Would You and/or Your Support Person Like to Participate in Your Care? be present at all times and include in POC.    Mutuality/Individual Preferences   How to Address Anxieties/Fears explain what is going on and keep informed     Goal: Discharge Needs Assessment  Outcome: Ongoing (interventions implemented as appropriate)   19 0630   Discharge Needs Assessment   Readmission Within the Last 30 Days no previous admission in last 30 days   Concerns to be Addressed no discharge needs identified   Patient/Family Anticipates Transition to home;home with family   Patient/Family Anticipated Services at Transition none   Transportation Anticipated car, drives self;family or friend will provide   Anticipated Changes Related to Illness none   Equipment Needed After Discharge none   Disability   Equipment Currently Used at Home none     Goal: Interprofessional Rounds/Family Conf  Outcome: Ongoing (interventions implemented as appropriate)   19 175   Interdisciplinary Rounds/Family Conf   Participants patient;nursing;physician       Problem:  Labor (Adult,Obstetrics,Pediatric)  Goal: Signs and Symptoms of Listed Potential Problems Will be Absent, Minimized or Managed ( Labor)  Outcome: Ongoing (interventions implemented as appropriate)    19 0630   Goal/Outcome Evaluation   Problems Assessed ( Labor) all   Problems Present ( Labor) other (see comments)  (contractions)       Problem: Prenatal Patient, Hospitalized (Adult,Obstetrics,Pediatric)  Goal: Signs and Symptoms of Listed Potential Problems Will be Absent, Minimized or Managed (Prenatal Patient, Hospitalized)  Outcome: Ongoing (interventions implemented as appropriate)   19 1755   Goal/Outcome Evaluation   Problems Assessed (Prenatal Patient) all   Problems Present (Prenatal Patient)  labor

## 2019-07-17 NOTE — NURSING NOTE
Beginning at 2330, RN is at bedside attempting to trace FHR.  Fetus is extremely active and heart tones are audible.   Will continue to attempt to trace FHT.

## 2019-07-17 NOTE — PROGRESS NOTES
"   LOS: 0 days   Patient Care Team:  Angie Morton APRN as PCP - General (Nurse Practitioner)    Chief Complaint:  contractions    Subjective     History of Present Illness - 19 year old primigravid at 31 weeks admitted for  labor last night.  Patient with regular contractions, abdominal/pelvic pain and dilated cervix.  She was given betamethasone and Magnesium sulfate.  Contractions have subsided and patient feels improved.  She denies bleeding or spotting.  No leakage of fluid.  She reports excellent FM.    Subjective    History taken from: patient    Objective     Vital Signs  Temp:  [97.7 °F (36.5 °C)-98.2 °F (36.8 °C)] 97.7 °F (36.5 °C)  Heart Rate:  [69-88] 82  Resp:  [16-18] 17  BP: ()/(38-65) 114/58    Objective:  General Appearance:  Comfortable and well-appearing.    Vital signs: (most recent): Blood pressure 114/58, pulse 82, temperature 97.7 °F (36.5 °C), temperature source Oral, resp. rate 17, height 170.2 cm (67\"), weight 61.7 kg (136 lb), last menstrual period 2018, SpO2 100 %, currently breastfeeding.  Vital signs are normal.    Output: Producing urine.    HEENT: Normal HEENT exam.    Lungs:  Normal effort.    Heart: Normal rate.    Abdomen: Abdomen is soft.  There is no abdominal tenderness.     Extremities: Normal range of motion.    Neurological: Patient is alert.  Patient has normal coordination.    Skin:  Warm and dry.              Results Review:     I reviewed the patient's new clinical results.    NST - Category 1  Medication Review: magnesium sulfate and betamethasone    Assessment/Plan       Pregnancy     labor      Assessment & Plan  IUP at 31 weeks with  labor  - Symptoms have significantly resolved.  She feels improved and no evidence of regular contractions.  Recommend cervical length and growth scan today.  Discussed significance of cervical length with patient.  If cervix is shortened, consideration for extended inpatient management.  If " normal cervical length and no apparent progression of  labor, may consider outpatient follow up.  Consider discontinuing tocolysis after 24 to 48 hours.  - Fetal status reassuring overall.    Hernesto Sandoval MD  19  8:16 AM    Time: More than 50% of time spent in counseling and coordination of care:  Total face-to-face/floor time 30 min.  Time spent in counseling 20 min. Counseling included the following topics: management of  labor

## 2019-07-18 LAB — BUPRENORPHINE UR QL: NEGATIVE NG/ML

## 2019-07-18 PROCEDURE — 59025 FETAL NON-STRESS TEST: CPT | Performed by: OBSTETRICS & GYNECOLOGY

## 2019-07-18 PROCEDURE — 99232 SBSQ HOSP IP/OBS MODERATE 35: CPT | Performed by: OBSTETRICS & GYNECOLOGY

## 2019-07-18 PROCEDURE — 59025 FETAL NON-STRESS TEST: CPT

## 2019-07-18 RX ORDER — PRENATAL VIT NO.126/IRON/FOLIC 28MG-0.8MG
1 TABLET ORAL DAILY
Status: DISCONTINUED | OUTPATIENT
Start: 2019-07-18 | End: 2019-07-19 | Stop reason: HOSPADM

## 2019-07-18 RX ORDER — FERROUS SULFATE 325(65) MG
325 TABLET ORAL
Status: DISCONTINUED | OUTPATIENT
Start: 2019-07-18 | End: 2019-07-19 | Stop reason: HOSPADM

## 2019-07-18 RX ADMIN — IBUPROFEN 400 MG: 400 TABLET, FILM COATED ORAL at 20:56

## 2019-07-18 RX ADMIN — SODIUM CHLORIDE, PRESERVATIVE FREE 10 ML: 5 INJECTION INTRAVENOUS at 21:32

## 2019-07-18 RX ADMIN — SODIUM CHLORIDE, PRESERVATIVE FREE 3 ML: 5 INJECTION INTRAVENOUS at 10:30

## 2019-07-18 RX ADMIN — FERROUS SULFATE TAB 325 MG (65 MG ELEMENTAL FE) 325 MG: 325 (65 FE) TAB at 18:51

## 2019-07-18 RX ADMIN — IBUPROFEN 400 MG: 400 TABLET, FILM COATED ORAL at 10:38

## 2019-07-18 RX ADMIN — Medication 1 TABLET: at 16:59

## 2019-07-18 NOTE — PLAN OF CARE
Problem: Patient Care Overview  Goal: Plan of Care Review  Outcome: Ongoing (interventions implemented as appropriate)   19 4532   Coping/Psychosocial   Plan of Care Reviewed With patient   Plan of Care Review   Progress improving   OTHER   Outcome Summary RNST; no LOF, no VB, +FM, +UCs on [place], but pt. does not feel them; patient to stay inpatient at this time; doing very well overall     Goal: Individualization and Mutuality  Outcome: Ongoing (interventions implemented as appropriate)    Goal: Interprofessional Rounds/Family Conf  Outcome: Ongoing (interventions implemented as appropriate)      Problem:  Labor (Adult,Obstetrics,Pediatric)  Goal: Signs and Symptoms of Listed Potential Problems Will be Absent, Minimized or Managed ( Labor)  Outcome: Ongoing (interventions implemented as appropriate)      Problem: Prenatal Patient, Hospitalized (Adult,Obstetrics,Pediatric)  Goal: Signs and Symptoms of Listed Potential Problems Will be Absent, Minimized or Managed (Prenatal Patient, Hospitalized)  Outcome: Ongoing (interventions implemented as appropriate)

## 2019-07-18 NOTE — NON STRESS TEST
Nannette Godinez, a  at 31w1d with an ANDREA of 2019, by Ultrasound, was seen at [facility]TE[facility] for a nonstress test.    Chief Complaint   Patient presents with   • Abdominal Cramping     Pt reports cramping that started around 1000 this morning that comes and goes frequently. Pt denies VB and LOF. Pt reports less FM that she is used to since this morning.       Patient Active Problem List   Diagnosis   • Primigravida   • Pregnancy   •  labor       Start Time: 1028  Stop Time: 1120      Interpretation A  Nonstress Test Interpretation A: Reactive (19 1120 : Orin Rhodes, RN)

## 2019-07-18 NOTE — NON STRESS TEST
Nannette Godinez, a  at 31w1d with an ANDREA of 2019, by Ultrasound, was seen at [facility]TE[facility] UN[facility] for a nonstress test.    Chief Complaint   Patient presents with   • Abdominal Cramping     Pt reports cramping that started around 1000 this morning that comes and goes frequently. Pt denies VB and LOF. Pt reports less FM that she is used to since this morning.       Patient Active Problem List   Diagnosis   • Primigravida   • Pregnancy   •  labor       Start Time:   Stop Time: 0  NST Rusty Reed RN

## 2019-07-18 NOTE — NURSING NOTE
Called Dr. Peterson around 1900 to inform him of pt. C/o one or two UCs, along with some back pain; RNST; Dr. Peterson states to take pt. Off monitor at this time, but to placed her back on PRN. He encourages oral fluids. Pt. Has not felt any UCs while on the monitor.

## 2019-07-18 NOTE — PAYOR COMM NOTE
"Alexandrea Smalls  Patient Accommodations Coordinator  [facility]   [address]  [place], [place] [address] [phone] Office  [phone] Direct  [phone] Fax    Radha Avalos (19 y.o. Female)     Date of Birth Social Security Number Address Home Phone MRN    2000  [facility] [id number] [phone] [id number]    Yarsani Marital Status          Non-Orthodox Single       Admission Date Admission Type Admitting Provider Attending Provider Department, Room/Bed    7/16/19 Elective Lebder, MD Nicholas Thibodeaux John R, MD [facility] ANTEPARTUM UNIT, 3305/1    Discharge Date Discharge Disposition Discharge Destination                       Attending Provider:  Cezar Peterson MD    Allergies:  No Known Allergies    Isolation:  None   Infection:  None   Code Status:  CPR    Ht:  170.2 cm (67\")   Wt:  61.7 kg (136 lb)    Admission Cmt:  None   Principal Problem:  None                Active Insurance as of 7/16/2019     Primary Coverage     Payor Plan Insurance Group Employer/Plan Group    [employer]HE[employer] BLUE CROSS ANTHEM BLUE CROSS BLUE SHIELD PPO [id number]     Payor Plan Address Payor Plan Phone Number Payor Plan Fax Number Effective Dates    [address] [phone]  5/5/2019 - None Entered    [facility] [id number]       Subscriber Name Subscriber Birth Date Member ID       RADHA AVALOS 2000 [id number]           Secondary Coverage     Payor Plan Insurance Group Employer/Plan Group    [employer] PAS[employer]ORT      Payor Plan Address Payor Plan Phone Number Payor Plan Fax Number Effective Dates    [address] [phone]  10/1/2015 - None Entered    [facility] [address]       Subscriber Name Subscriber Birth Date Member ID       RADHA AVALOS 2000 [id number]                 Emergency Contacts      (Rel.) Home Phone Work Phone Mobile Phone    PETRA AVALOS (Father) -- -- [phone]               History & Physical      Lebder, " Hernesto Oconnell MD at 2019  6:48 PM                Patient Care Team:  Angie Morton APRN as PCP - General (Nurse Practitioner)    Chief complaint - cramping and pelvic pressure    Subjective     History of Present Illness - Patient is a 19 year old  at 30+ weeks who presents to labor and delivery with complaints of increased cramping and pressure today.  Patient denies bleeding or spotting.  She had an episode of cramping three weeks ago and presented to labor and delivery.  She was evaluated and  labor was ruled out and she was sent home.  After having increasing and progressive symptoms today, she notified her doctor who recommended that she present to labor and delivery.  She reports good FM.  She has been hydrating well and has had no recent sexual activity.    Review of Systems   Constitutional: Negative for chills and fever.   HENT: Negative for dental problem and drooling.    Eyes: Negative for photophobia and visual disturbance.   Respiratory: Negative for shortness of breath and wheezing.    Cardiovascular: Negative for chest pain and palpitations.   Gastrointestinal: Negative for nausea and vomiting.   Genitourinary: Negative for dysuria and frequency.   Musculoskeletal: Negative for gait problem and joint swelling.   Skin: Negative for pallor and rash.   Neurological: Negative for seizures and speech difficulty.   Hematological: Negative for adenopathy. Does not bruise/bleed easily.   Psychiatric/Behavioral: Negative for behavioral problems and confusion.        Past Medical History:   Diagnosis Date   • Deliberate self-cutting     Pt has old scars on both legs; no problems since 15     History reviewed. No pertinent surgical history.  Family History   Problem Relation Age of Onset   • Hypertension Paternal Uncle      Social History     Tobacco Use   • Smoking status: Former Smoker     Types: Cigarettes   • Smokeless tobacco: Never Used   Substance Use Topics   • Alcohol use: No      Frequency: Never   • Drug use: No     Medications Prior to Admission   Medication Sig Dispense Refill Last Dose   • ferrous sulfate 325 (65 FE) MG EC tablet Take 1 tablet by mouth 2 (Two) Times a Day With Meals. 60 tablet 11 7/15/2019 at    • Prenatal Vit-Fe Fum-FA-Omega (PNV PRENATAL PLUS MULTIVIT+DHA) 27-1 & 312 MG misc TAKE 1 TAB & 1 CAPSULE BY MOUTH ONE TIME A DAY  3 7/15/2019 at 2000   • amoxicillin (AMOXIL) 500 MG capsule Take 2 capsules by mouth 3 (Three) Times a Day. 21 capsule 0 2019 at 2200     Allergies:  Patient has no known allergies.    Objective      Vital Signs  Temp:  [98.2 °F (36.8 °C)] 98.2 °F (36.8 °C)  Heart Rate:  [88] 88  Resp:  [17] 17  BP: (112)/(57) 112/57    Physical Exam   Constitutional: She appears well-developed and well-nourished.   HENT:   Right Ear: External ear normal.   Left Ear: External ear normal.   Nose: Nose normal.   Eyes: Conjunctivae are normal.   Neck: Normal range of motion. Neck supple. No thyromegaly present.   Cardiovascular: Normal rate.   Pulmonary/Chest: Effort normal. No stridor.   Abdominal: Soft. There is no tenderness. There is no guarding.   Genitourinary:   Genitourinary Comments: Cervix 60/1/-3   Musculoskeletal: Normal range of motion.   Neurological: She is alert. Coordination normal.   Skin: Skin is warm and dry.   Psychiatric: She has a normal mood and affect. Her behavior is normal. Judgment and thought content normal.   Vitals reviewed.      Results Review:   Urine tox, urinalysis negative.              Fetal Fibronectin positive.    NST - Category 1.  [place] with regular uterine contractions.      Assessment/Plan       Pregnancy     labor      Assessment & Plan  IUP at 30+ weeks with  labor  - Patient with cervical dilation and regular contractions.  Her FFN testing was also positive.  Her risks of  labor/delivery are increased.  I recommended magnesium sulfate for tocolysis with betamethasone administration for fetal lung  maturity.  Will monitor closely.  Likely sonogram in morning for cervical length and growth.  - Tobacco use.  Discussed with patient that smoking impedes placental function which increases risks of complications including  labor and birth, IUGR, stillbirth and SGA.  Patient strongly advised to quit smoking for the remainder of her pregnancy.    I discussed the patients findings and my recommendations with patient and family    Hernesto Sandoval MD  19  6:48 PM    Time: More than 50% of time spent in counseling and coordination of care:  Total face-to-face/floor time 50 min.  Time spent in counseling 25 min. Counseling included the following topics: management of  labor, risks for progression and delivery and counseling on smoking cessation.    Electronically signed by Hernesto Sandoval MD at 2019  7:05 PM       Prior to Admission Medications     Prescriptions Last Dose Informant Patient Reported? Taking?    ferrous sulfate 325 (65 FE) MG EC tablet 7/15/2019  No Yes    Take 1 tablet by mouth 2 (Two) Times a Day With Meals.    Prenatal Vit-Fe Fum-FA-Omega (PNV PRENATAL PLUS MULTIVIT+DHA) 27-1 & 312 MG misc 7/15/2019  Yes Yes    TAKE 1 TAB & 1 CAPSULE BY MOUTH ONE TIME A DAY    amoxicillin (AMOXIL) 500 MG capsule   No No    Take 2 capsules by mouth 3 (Three) Times a Day.          Hospital Medications (all)       Dose Frequency Start End    betamethasone acetate-betamethasone sodium phosphate (CELESTONE SOLUSPAN) injection 12 mg 12 mg Every 24 Hours 2019    Sig - Route: Inject 2 mL into the appropriate muscle as directed by prescriber Daily. - Intramuscular    ferrous sulfate tablet 325 mg 325 mg Daily With Dinner 2019     Sig - Route: Take 1 tablet by mouth Daily With Dinner. - Oral    ibuprofen (ADVIL,MOTRIN) tablet 400 mg 400 mg 2 Times Daily 2019    Sig - Route: Take 1 tablet by mouth 2 (Two) Times a Day. - Oral    ibuprofen (ADVIL,MOTRIN)  "tablet 600 mg 600 mg Once 7/17/2019 7/17/2019    Sig - Route: Take 1 tablet by mouth 1 (One) Time. - Oral    lactated ringers bolus 1,000 mL 1,000 mL As Needed 7/16/2019     Sig - Route: Infuse 1,000 mL into a venous catheter As Needed (FHR decelerations or decreased variability). - Intravenous    lactated ringers infusion 75 mL/hr Continuous 7/16/2019     Sig - Route: Infuse 75 mL/hr into a venous catheter Continuous. - Intravenous    magnesium sulfate 20 GM/500ML infusion 2 g/hr Continuous 7/16/2019 7/20/2019    Sig - Route: Infuse 2 g/hr into a venous catheter Continuous. - Intravenous    Linked Group 1:  \"Followed by\" Linked Group Details        magnesium sulfate bolus from bag 0.04 g/mL 4 g 4 g Once 7/16/2019 7/16/2019    Sig - Route: Infuse 100 mL into a venous catheter 1 (One) Time. - Intravenous    Linked Group 1:  \"Followed by\" Linked Group Details        prenatal (CLASSIC) vitamin tablet 1 tablet 1 tablet Daily 7/18/2019     Sig - Route: Take 1 tablet by mouth Daily. - Oral    sodium chloride 0.9 % flush 3 mL 3 mL Every 12 Hours Scheduled 7/16/2019     Sig - Route: Infuse 3 mL into a venous catheter Every 12 (Twelve) Hours. - Intravenous    sodium chloride 0.9 % flush 3-10 mL 3-10 mL As Needed 7/16/2019     Sig - Route: Infuse 3-10 mL into a venous catheter As Needed for Line Care. - Intravenous    magnesium sulfate in D5W 1g/100mL (PREMIX) (Discontinued) 3 g/hr Continuous 7/16/2019 7/16/2019    Sig - Route: Infuse 3 g/hr into a venous catheter Continuous. - Intravenous    Reason for Discontinue: *Error    magnesium sulfate in D5W 1g/100mL (PREMIX) (Discontinued) 3 g/hr Continuous 7/16/2019 7/18/2019    Sig - Route: Infuse 3 g/hr into a venous catheter Continuous. - Intravenous    Reason for Discontinue: Duplicate order          Lab Results (last 48 hours)     Procedure Component Value Units Date/Time    Group B Streptococcus Culture - Swab, Vaginal/Rectum [[id number]]  (Normal) Collected:  07/17/19 1231    " Specimen:  Swab from Vaginal/Rectum Updated:  07/18/19 0930     Group B Strep Culture No Group B Streptococcus isolated    Magnesium [[id number]]  (Abnormal) Collected:  07/17/19 1103    Specimen:  Blood Updated:  07/17/19 1217     Magnesium 7.4 mg/dL     CBC & Differential [[id number]] Collected:  07/16/19 1852    Specimen:  Blood Updated:  07/16/19 1930    Narrative:       The following orders were created for panel order CBC & Differential.  Procedure                               Abnormality         Status                     ---------                               -----------         ------                     CBC Auto Differential[[id number]]        Abnormal            Final result                 Please view results for these tests on the individual orders.    CBC Auto Differential [[id number]]  (Abnormal) Collected:  07/16/19 1852    Specimen:  Blood Updated:  07/16/19 1930     WBC 13.14 10*3/mm3      RBC 3.38 10*6/mm3      Hemoglobin 10.8 g/dL      Hematocrit 32.2 %      MCV 95.3 fL      MCH 32.0 pg      MCHC 33.5 g/dL      RDW 12.1 %      RDW-SD 41.9 fl      MPV 11.1 fL      Platelets 146 10*3/mm3      Neutrophil % 73.3 %      Lymphocyte % 18.1 %      Monocyte % 5.1 %      Eosinophil % 0.5 %      Basophil % 0.3 %      Immature Grans % 2.7 %      Neutrophils, Absolute 9.63 10*3/mm3      Lymphocytes, Absolute 2.38 10*3/mm3      Monocytes, Absolute 0.67 10*3/mm3      Eosinophils, Absolute 0.07 10*3/mm3      Basophils, Absolute 0.04 10*3/mm3      Immature Grans, Absolute 0.35 10*3/mm3      nRBC 0.0 /100 WBC     URINE DRUG SCREEN PLUS BUPRENORPHINE - [[id number]] Collected:  07/16/19 1722     Updated:  07/16/19 1841    Narrative:       The following orders were created for panel order URINE DRUG SCREEN PLUS BUPRENORPHINE -.  Procedure                               Abnormality         Status                     ---------                               -----------         ------                     Urine Drug Screen -  Urin...[[id number]]  Normal              Final result               Buprenorphine Screen Uri...[[id number]]                      In process                   Please view results for these tests on the individual orders.    Urine Drug Screen - Urine, Clean Catch [[id number]]  (Normal) Collected:  07/16/19 1722    Specimen:  Urine, Clean Catch Updated:  07/16/19 1841     Amphet/Methamphet, Screen Negative     Barbiturates Screen, Urine Negative     Benzodiazepine Screen, Urine Negative     Cocaine Screen, Urine Negative     Opiate Screen Negative     THC, Screen, Urine Negative     Methadone Screen, Urine Negative     Oxycodone Screen, Urine Negative    Narrative:       Negative Thresholds For Drugs Screened:     Amphetamines               500 ng/ml   Barbiturates               200 ng/ml   Benzodiazepines            100 ng/ml   Cocaine                    300 ng/ml   Methadone                  300 ng/ml   Opiates                    300 ng/ml   Oxycodone                  100 ng/ml   THC                        50 ng/ml    The Normal Value for all drugs tested is negative. This report includes final unconfirmed screening results to be used for medical treatment purposes only. Unconfirmed results must not be used for non-medical purposes such as employment or legal testing. Clinical consideration should be applied to any drug of abuse test, particulary when unconfirmed results are used.    Urinalysis With Culture If Indicated - Urine, Random Void [[id number]]  (Abnormal) Collected:  07/16/19 1722    Specimen:  Urine, Random Void Updated:  07/16/19 1804     Color, UA Yellow     Appearance, UA Clear     pH, UA 6.5     Specific Gravity, UA <=1.005     Glucose, UA Negative     Ketones, UA 15 mg/dL (1+)     Bilirubin, UA Negative     Blood, UA Negative     Protein, UA Negative     Leuk Esterase, UA Negative     Nitrite, UA Negative     Urobilinogen, UA 0.2 E.U./dL    Narrative:       Urine microscopic not indicated.    Fetal  Fibronectin - Vaginal Fluid, Cervix [[id number]]  (Abnormal) Collected:  07/16/19 1626    Specimen:  Vaginal Fluid from Cervix Updated:  07/16/19 1740     Fetal Fibronectin Positive     Bloody Specimen? no    Buprenorphine Screen Urine - Urine, Clean Catch [[id number]] Collected:  07/16/19 1722    Specimen:  Urine, Clean Catch Updated:  07/16/19 1731          Lab Results (last 48 hours)     Procedure Component Value Units Date/Time    Group B Streptococcus Culture - Swab, Vaginal/Rectum [[id number]]  (Normal) Collected:  07/17/19 1231    Specimen:  Swab from Vaginal/Rectum Updated:  07/18/19 0930     Group B Strep Culture No Group B Streptococcus isolated    Magnesium [[id number]]  (Abnormal) Collected:  07/17/19 1103    Specimen:  Blood Updated:  07/17/19 1217     Magnesium 7.4 mg/dL     CBC & Differential [[id number]] Collected:  07/16/19 1852    Specimen:  Blood Updated:  07/16/19 1930    Narrative:       The following orders were created for panel order CBC & Differential.  Procedure                               Abnormality         Status                     ---------                               -----------         ------                     CBC Auto Differential[[id number]]        Abnormal            Final result                 Please view results for these tests on the individual orders.    CBC Auto Differential [[id number]]  (Abnormal) Collected:  07/16/19 1852    Specimen:  Blood Updated:  07/16/19 1930     WBC 13.14 10*3/mm3      RBC 3.38 10*6/mm3      Hemoglobin 10.8 g/dL      Hematocrit 32.2 %      MCV 95.3 fL      MCH 32.0 pg      MCHC 33.5 g/dL      RDW 12.1 %      RDW-SD 41.9 fl      MPV 11.1 fL      Platelets 146 10*3/mm3      Neutrophil % 73.3 %      Lymphocyte % 18.1 %      Monocyte % 5.1 %      Eosinophil % 0.5 %      Basophil % 0.3 %      Immature Grans % 2.7 %      Neutrophils, Absolute 9.63 10*3/mm3      Lymphocytes, Absolute 2.38 10*3/mm3      Monocytes, Absolute 0.67 10*3/mm3      Eosinophils,  Absolute 0.07 10*3/mm3      Basophils, Absolute 0.04 10*3/mm3      Immature Grans, Absolute 0.35 10*3/mm3      nRBC 0.0 /100 WBC     URINE DRUG SCREEN PLUS BUPRENORPHINE - [[id number]] Collected:  07/16/19 1722     Updated:  07/16/19 1841    Narrative:       The following orders were created for panel order URINE DRUG SCREEN PLUS BUPRENORPHINE -.  Procedure                               Abnormality         Status                     ---------                               -----------         ------                     Urine Drug Screen - Urin...[[id number]]  Normal              Final result               Buprenorphine Screen Uri...[[id number]]                      In process                   Please view results for these tests on the individual orders.    Urine Drug Screen - Urine, Clean Catch [[id number]]  (Normal) Collected:  07/16/19 1722    Specimen:  Urine, Clean Catch Updated:  07/16/19 1841     Amphet/Methamphet, Screen Negative     Barbiturates Screen, Urine Negative     Benzodiazepine Screen, Urine Negative     Cocaine Screen, Urine Negative     Opiate Screen Negative     THC, Screen, Urine Negative     Methadone Screen, Urine Negative     Oxycodone Screen, Urine Negative    Narrative:       Negative Thresholds For Drugs Screened:     Amphetamines               500 ng/ml   Barbiturates               200 ng/ml   Benzodiazepines            100 ng/ml   Cocaine                    300 ng/ml   Methadone                  300 ng/ml   Opiates                    300 ng/ml   Oxycodone                  100 ng/ml   THC                        50 ng/ml    The Normal Value for all drugs tested is negative. This report includes final unconfirmed screening results to be used for medical treatment purposes only. Unconfirmed results must not be used for non-medical purposes such as employment or legal testing. Clinical consideration should be applied to any drug of abuse test, particulary when unconfirmed results are used.     Urinalysis With Culture If Indicated - Urine, Random Void [[id number]]  (Abnormal) Collected:  19    Specimen:  Urine, Random Void Updated:  19 180     Color, UA Yellow     Appearance, UA Clear     pH, UA 6.5     Specific Gravity, UA <=1.005     Glucose, UA Negative     Ketones, UA 15 mg/dL (1+)     Bilirubin, UA Negative     Blood, UA Negative     Protein, UA Negative     Leuk Esterase, UA Negative     Nitrite, UA Negative     Urobilinogen, UA 0.2 E.U./dL    Narrative:       Urine microscopic not indicated.    Fetal Fibronectin - Vaginal Fluid, Cervix [[id number]]  (Abnormal) Collected:  19 162    Specimen:  Vaginal Fluid from Cervix Updated:  19 1740     Fetal Fibronectin Positive     Bloody Specimen? no    Buprenorphine Screen Urine - Urine, Clean Catch [[id number]] Collected:  19    Specimen:  Urine, Clean Catch Updated:  19 173        ECG/EMG Results (last 48 hours)     ** No results found for the last 48 hours. **        Operative/Procedure Notes (last 48 hours) (Notes from 2019  4:51 PM through 2019  4:51 PM)     No notes of this type exist for this encounter.           Physician Progress Notes (last 48 hours) (Notes from 2019  4:51 PM through 2019  4:51 PM)      Fortunato Vera MD at 2019 10:46 AM          Hospital Day # 3      Patient name: Nannette Godinez  Age: 19 y.o.  Sex: female  YOB: 2000   MRN: [id number]  Admission Date: 2019    Gestational age: 31w1d    Diagnosis:   Pregnancy     labor      Subjective:    19 y.o.  @ 31w1d - admitted on Tuesday evening with regular contractions, threatened  labor. Received betamethasone for FLM and Magnesium for both tocolysis and neuro protection.    Overnight, she has no complaints.   No cramping  No leaking   No bleeding   Good fetal movement.   Per patient told by Dr. Sandoval would be discharged today. (still less than 48 hours) - will be at 6 PM tonight  or so.     Objective:      Temp:  [98.1 °F (36.7 °C)-98.5 °F (36.9 °C)] 98.2 °F (36.8 °C)  Heart Rate:  [61-93] 61  Resp:  [16-18] 16  BP: ()/(42-64) 108/48    Exam:     Gen : WN, WD female in NAD     Abdomen: Soft, non-tender and gravid to consistent with early third trimester     Extremities: No evidence of DVT, Edema 1+     Reflexes: 2+ bilaterally, no clonus     Ultrasound:      Cervix 1.5 cm   AGA - 52% (3#13oz)   Normal LESVIA at 19 cm   Cephalic and No previa   BPP      Labs:  Lab Results   Component Value Date    WBC 13.14 (H) 2019    HGB 10.8 (L) 2019    HCT 32.2 (L) 2019    MCV 95.3 2019     2019     Urinalysis negative   FFN - Positive   UDS - not in system (? Done)   GBS negative   ABS - residual rhogam     Assessment:      1) Pregnancy at 31w1d  2) Threatened  labor with significant risk factors of short cervix and +FFN -- However MFM has seen (see note with ultrasound) and she is potentially candidate for outpatient management. We will go ahead and observe for a few more hours as was having contractions on NST (but not perceptible to patient).  If those resolve with hydration and voiding, still may consider. If not will need to at least observe until tomorrow.       Plan:       Continue inpatient today due to uterine irritability with risk factors for  labor. If stable overnight without intervention, can consider for outpatient management.     I spent 20 of 25 minutes face to face with the patient, during this time we reviewed her medical diagnosis and our treatment plan as outlined above.        Fortunato Vera MD  2019  10:46 AM        Electronically signed by Fortunato Vera MD at 2019 10:57 AM     Hernesto Sandoval MD at 2019  8:16 AM           LOS: 0 days   Patient Care Team:  Angie Morton APRN as PCP - General (Nurse Practitioner)    Chief Complaint:  contractions    Subjective     History  "of Present Illness - 19 year old primigravid at 31 weeks admitted for  labor last night.  Patient with regular contractions, abdominal/pelvic pain and dilated cervix.  She was given betamethasone and Magnesium sulfate.  Contractions have subsided and patient feels improved.  She denies bleeding or spotting.  No leakage of fluid.  She reports excellent FM.    Subjective    History taken from: patient    Objective     Vital Signs  Temp:  [97.7 °F (36.5 °C)-98.2 °F (36.8 °C)] 97.7 °F (36.5 °C)  Heart Rate:  [69-88] 82  Resp:  [16-18] 17  BP: ()/(38-65) 114/58    Objective:  General Appearance:  Comfortable and well-appearing.    Vital signs: (most recent): Blood pressure 114/58, pulse 82, temperature 97.7 °F (36.5 °C), temperature source Oral, resp. rate 17, height 170.2 cm (67\"), weight 61.7 kg (136 lb), last menstrual period 2018, SpO2 100 %, currently breastfeeding.  Vital signs are normal.    Output: Producing urine.    HEENT: Normal HEENT exam.    Lungs:  Normal effort.    Heart: Normal rate.    Abdomen: Abdomen is soft.  There is no abdominal tenderness.     Extremities: Normal range of motion.    Neurological: Patient is alert.  Patient has normal coordination.    Skin:  Warm and dry.              Results Review:     I reviewed the patient's new clinical results.    NST - Category 1  Medication Review: magnesium sulfate and betamethasone    Assessment/Plan       Pregnancy     labor      Assessment & Plan  IUP at 31 weeks with  labor  - Symptoms have significantly resolved.  She feels improved and no evidence of regular contractions.  Recommend cervical length and growth scan today.  Discussed significance of cervical length with patient.  If cervix is shortened, consideration for extended inpatient management.  If normal cervical length and no apparent progression of  labor, may consider outpatient follow up.  Consider discontinuing tocolysis after 24 to 48 hours.  - Fetal " status reassuring overall.    Hernesto Sandoval MD  19  8:16 AM    Time: More than 50% of time spent in counseling and coordination of care:  Total face-to-face/floor time 30 min.  Time spent in counseling 20 min. Counseling included the following topics: management of  labor        Electronically signed by Hernesto Sandoval MD at 2019  8:24 AM       Consult Notes (last 48 hours) (Notes from 2019  4:51 PM through 2019  4:51 PM)     No notes of this type exist for this encounter.          Maternal Vitals (last 2 days)     Date/Time   Temp   Pulse   Resp   BP   SpO2    19 1020   98.2 (36.8)   61   16   108/48   98    19 0920   --   --   --   --   --    Comment rows:    OBSERV: Pt. sleeping and asking that I come back to evaluate her. at 19 0919 0845   --   --   --   --   --    Comment rows:    OBSERV: Pt. sleeping at 19 0845    19 0419   98.3 (36.8)   71   16   100/43   98    19 2350   98.4 (36.9)   80   16   95/42   98    18   --   --   --   --   --    Comment rows:    OBSERV: up to shower at 19 1900   98.5 (36.9)   89   18   108/63   100    19 1630   98.1 (36.7)   70   18   123/64   98    19 1518   --   70   --   --   100    19 1515   --   --   18   --   --    19 1513   --   73   --   --   98    19 1508   --   67   --   --   99    19 1506   --   79   --   --   100    19 1501   --   73   --   109/60   100    19 1456   --   79   --   --   99    19 1451   --   74   --   --   99    19 1446   --   73   --   --   99    19 1441   --   83   --   --   100    19 1436   --   80   --   --   99    19 1431   --   73   --   108/50   98    19 1416   --   74   --   --   99    19 1411   --   77   --   --   100    19 1406   --   78   --   --   99    19 1401   --   74   --   108/49   100    19 1356   --   74    --   --   99 07/17/19 1355   --   --   17   --   --    07/17/19 1351   --   76   --   --   100 07/17/19 1346   --   80   --   --   100 07/17/19 1341   --   76   --   --   100 07/17/19 1336   --   82   --   --   99 07/17/19 1331   --   74   --   111/52   --    07/17/19 1328   --   82   --   --   100 07/17/19 1301   --   79   --   109/52   --    07/17/19 1258   --   84   --   --   100 07/17/19 1253   --   79   --   --   100 07/17/19 1233   --   86   --   --   100 07/17/19 1231   --   82   17   113/54   --    07/17/19 1228   --   85   --   --   100 07/17/19 1223   --   74   --   --   100 07/17/19 1218   --   80   --   --   100 07/17/19 1213   --   93   --   --   100 07/17/19 1208   --   80   --   --   100 07/17/19 1203   --   85   --   --   100 07/17/19 1201   --   85   --   110/55   --    07/17/19 1158   --   81   --   --   100 07/17/19 1153   --   80   --   --   100 07/17/19 1148   --   80   --   --   --    07/17/19 1144   --   80   --   117/60   --    07/17/19 1138   --   85   --   --   100 07/17/19 1133   --   85   --   --   100 07/17/19 1131   --   82   --   112/53   100    07/17/19 1100   --   74   --   96/45   --    07/17/19 1052   --   76   --   --   --    07/17/19 1047   --   73   --   --   --    07/17/19 1046   --   70   --   104/45   --    07/17/19 1042   --   73   --   --   --    07/17/19 1037   --   75   --   --   --    07/17/19 1032   --   72   --   --   --    07/17/19 1031   --   76   --   103/46   --    07/17/19 1027   --   73   --   --   100 07/17/19 1026   97.6 (36.4)   74   16   99/52   100    07/17/19 1025   --   --   --   --   100 07/17/19 1022   --   85   --   --   --    07/17/19 1014   --   73   --   --   --    07/17/19 1009   --   71   --   --   100 07/17/19 1004   --   73   --   --   100 07/17/19 1000   --   80   --   --   --    07/17/19 0954   --   84   --   --   --    07/17/19 0949   --   90   --   --   100 07/17/19 0944    --   89   --   --   100    07/17/19 0939   --   90   --   --   100    07/17/19 0937   --   80   --   117/61   --    07/17/19 0830   --   --   --   --   --    Comment rows:    OBSERV: Pt taken to [facility] via w/c for U/S at 07/17/19 0830    07/17/19 0820   --   75   --   --   100    07/17/19 0815   --   75   --   --   100    07/17/19 0742   --   82   17   114/58   100    07/17/19 0737   --   78   --   --   100    07/17/19 0728   --   86   --   --   100    07/17/19 0723   --   77   --   --   99    07/17/19 0718   --   74   --   --   99    07/17/19 0713   --   78   --   --   99    07/17/19 0708   --   77   --   --   100    07/17/19 0703   --   70   --   --   99    07/17/19 0701   --   70   --   --   --    07/17/19 0700   --   69   --   97/45   100    07/17/19 0653   --   69   --   --   99    07/17/19 0640   --   --   18   --   --    07/17/19 0605   --   71   --   101/45   100    07/17/19 0600   97.7 (36.5)   --   --   --   --    07/17/19 0443   --   --   --   --   --    Comment rows:    OBSERV: pt up to bathroom at 07/17/19 0443    07/17/19 0400   --   74   --   97/44   99    07/17/19 0310   --   73   --   104/48   97    07/17/19 0300   --   79   --   102/51   --    07/17/19 0035   --   --   17   --   --    07/17/19 0010   --   80   --   --   99    07/17/19 0009   --   --   --   99/38  (Abnormal)    --    07/17/19 0000   --   73   --   100/40   99    07/16/19 2300   --   --   --   121/59   --    07/16/19 2232   --   75   --   --   100    07/16/19 2227   --   --   18   --   --    07/16/19 2200   --   78   --   116/56   100    07/16/19 2121   --   75   --   --   100    07/16/19 2118   --   70   --   119/58   --    07/16/19 2100   --   74   --   --   100    07/16/19 2034   --   --   --   125/64   --    07/16/19 2015   98 (36.7)   --   --   --   --    07/16/19 2000   --   86   --   117/54   100    07/16/19 1946   --   81   --   120/59   --    07/16/19 1942   --   76   --   --   99    07/16/19 1931   --   71   --   117/55    --    07/16/19 1924   --   --   16   --   --    07/16/19 1916   --   84   --   127/65   --    07/16/19 1900   --   80   --   --   99    07/16/19 1600   98.2 (36.8)   88   17   112/57   --            FHR (last 2 days)     Date/Time Fetal HR Assessment Method Fetal HR (beats/min) Fetal Heart Baseline Rate Fetal HR Variability Fetal HR Accelerations Fetal HR Decelerations Fetal HR Tracing Category    07/18/19 1120  external  125  normal range  moderate (amplitude range 6 to 25 bpm)  greater than/equal to 15 bpm;lasting at least 15 seconds  absent  --    07/18/19 1100  external  125  normal range  moderate (amplitude range 6 to 25 bpm)  greater than/equal to 15 bpm;lasting at least 15 seconds  absent  --    07/18/19 1028  external  --  --  --  --  --  --    07/17/19 2230  external  130  normal range  moderate (amplitude range 6 to 25 bpm)  lasting at least 15 seconds;greater than/equal to 15 bpm  absent  --    07/17/19 1527  external  125  normal range  moderate (amplitude range 6 to 25 bpm)  absent  absent  --    07/17/19 1500  external  125  normal range  moderate (amplitude range 6 to 25 bpm)  absent  absent  --    07/17/19 1430  external  120  normal range  moderate (amplitude range 6 to 25 bpm)  greater than/equal to 15 bpm;lasting at least 15 seconds  absent  --    07/17/19 1400  external  115  normal range  moderate (amplitude range 6 to 25 bpm)  absent  absent  --    07/17/19 1330  external  115  normal range  moderate (amplitude range 6 to 25 bpm)  greater than/equal to 15 bpm;lasting at least 15 seconds  absent  --    07/17/19 1300  external  120  normal range  moderate (amplitude range 6 to 25 bpm)  absent  absent  --    07/17/19 1230  external  120  normal range  moderate (amplitude range 6 to 25 bpm)  greater than/equal to 15 bpm;lasting at least 15 seconds  absent  --    07/17/19 1200  external  115  normal range  moderate (amplitude range 6 to 25 bpm)  greater than/equal to 15 bpm;lasting at least 15  seconds  absent  --    07/17/19 1130  external  115  normal range  moderate (amplitude range 6 to 25 bpm)  greater than/equal to 15 bpm;lasting at least 15 seconds  absent  --    07/17/19 1100  external  115  normal range  moderate (amplitude range 6 to 25 bpm)  greater than/equal to 15 bpm;lasting at least 15 seconds  absent  --    07/17/19 1030  external  115  normal range  moderate (amplitude range 6 to 25 bpm)  greater than/equal to 15 bpm;lasting at least 15 seconds  absent  --    07/17/19 1000  external  115  normal range  moderate (amplitude range 6 to 25 bpm)  greater than/equal to 15 bpm;lasting at least 15 seconds  absent  --    07/17/19 0830  external  120  normal range  moderate (amplitude range 6 to 25 bpm)  greater than/equal to 15 bpm;lasting at least 15 seconds  absent  --    07/17/19 0820  external  120  normal range  moderate (amplitude range 6 to 25 bpm)  greater than/equal to 15 bpm;lasting at least 15 seconds  absent  --    07/17/19 0800  external  120  normal range  moderate (amplitude range 6 to 25 bpm)  greater than/equal to 15 bpm;lasting at least 15 seconds  absent  --    07/17/19 0730  external  120  normal range  moderate (amplitude range 6 to 25 bpm)  lasting at least 15 seconds;greater than/equal to 15 bpm  absent  --    07/17/19 0700  external  115  normal range  moderate (amplitude range 6 to 25 bpm)  absent  absent  --    07/17/19 0630  external  115  normal range  moderate (amplitude range 6 to 25 bpm)  greater than/equal to 15 bpm;lasting at least 15 seconds  absent  --    07/17/19 0600  external  120  normal range  moderate (amplitude range 6 to 25 bpm)  greater than/equal to 15 bpm;lasting at least 15 seconds  absent  --    07/17/19 0530  external  115  normal range  moderate (amplitude range 6 to 25 bpm)  greater than/equal to 15 bpm;lasting at least 15 seconds  absent  --    07/17/19 0500  external  115  normal range  moderate (amplitude range 6 to 25 bpm)  greater than/equal  to 15 bpm;lasting at least 15 seconds  absent  --    07/17/19 0430  external  115  normal range  moderate (amplitude range 6 to 25 bpm)  greater than/equal to 15 bpm;lasting at least 15 seconds  absent  --    07/17/19 0400  external  115  normal range  moderate (amplitude range 6 to 25 bpm)  absent  absent  --    07/17/19 0330  external  115  normal range  moderate (amplitude range 6 to 25 bpm)  greater than/equal to 15 bpm;lasting at least 15 seconds  absent  --    07/17/19 0300  external  115  normal range  moderate (amplitude range 6 to 25 bpm)  greater than/equal to 15 bpm;lasting at least 15 seconds  absent  --    07/17/19 0230  external  115  normal range  moderate (amplitude range 6 to 25 bpm)  greater than/equal to 15 bpm;lasting at least 15 seconds  absent  --    07/17/19 0200  external  125  normal range  moderate (amplitude range 6 to 25 bpm)  greater than/equal to 15 bpm;lasting at least 15 seconds  absent  --    07/17/19 0130  external  115  normal range  moderate (amplitude range 6 to 25 bpm)  greater than/equal to 15 bpm;lasting at least 15 seconds  absent  --    07/17/19 0100  external  115  normal range  moderate (amplitude range 6 to 25 bpm)  greater than/equal to 15 bpm;lasting at least 15 seconds  absent  --    07/17/19 0030  external  115  normal range  moderate (amplitude range 6 to 25 bpm)  greater than/equal to 15 bpm;lasting at least 15 seconds  absent  --    07/17/19 0000  external  115  normal range  moderate (amplitude range 6 to 25 bpm)  lasting at least 15 seconds;greater than/equal to 15 bpm  absent  --    07/16/19 2330  external  120  normal range  moderate (amplitude range 6 to 25 bpm)  greater than/equal to 15 bpm;lasting at least 15 seconds  absent  --    07/16/19 2300  external  125  normal range  moderate (amplitude range 6 to 25 bpm)  greater than/equal to 15 bpm;lasting at least 15 seconds  absent  --    07/16/19 2230  external  125  normal range  moderate (amplitude range 6 to  25 bpm)  greater than/equal to 15 bpm;lasting at least 15 seconds  absent  --    07/16/19 2200  external  125  normal range  moderate (amplitude range 6 to 25 bpm)  greater than/equal to 15 bpm;lasting at least 15 seconds  absent  --    07/16/19 2130  external  130  normal range  moderate (amplitude range 6 to 25 bpm)  greater than/equal to 15 bpm;lasting at least 15 seconds  absent  --    07/16/19 2100  external  130  normal range  moderate (amplitude range 6 to 25 bpm)  greater than/equal to 15 bpm;lasting at least 15 seconds  absent  --    07/16/19 2030  external  130  normal range  moderate (amplitude range 6 to 25 bpm)  greater than/equal to 15 bpm;lasting at least 15 seconds  absent  --    07/16/19 2000  external  130  normal range  moderate (amplitude range 6 to 25 bpm)  greater than/equal to 15 bpm;lasting at least 15 seconds  absent  --    07/16/19 1930  external  130  normal range  moderate (amplitude range 6 to 25 bpm)  greater than/equal to 15 bpm;lasting at least 15 seconds  absent  --    07/16/19 1900  external  135  normal range  moderate (amplitude range 6 to 25 bpm)  greater than/equal to 10 bpm (32 wks gest or less);lasts at least 10 seconds (32 wks gest or less)  absent  --    07/16/19 1830  external  135  normal range  moderate (amplitude range 6 to 25 bpm)  greater than/equal to 10 bpm (32 wks gest or less);lasts at least 10 seconds (32 wks gest or less)  variable  --    07/16/19 1800  external  135  normal range  moderate (amplitude range 6 to 25 bpm)  greater than/equal to 10 bpm (32 wks gest or less);lasts at least 10 seconds (32 wks gest or less)  absent  --    07/16/19 1730  external  130  normal range  moderate (amplitude range 6 to 25 bpm)  greater than/equal to 10 bpm (32 wks gest or less);lasts at least 10 seconds (32 wks gest or less)  absent  --    07/16/19 1700  external  135  normal range  moderate (amplitude range 6 to 25 bpm)  lasts at least 10 seconds (32 wks gest or  less);greater than/equal to 10 bpm (32 wks gest or less)  absent  --        Uterine Activity (last 2 days)     Date/Time Method Contraction Frequency (Minutes) Contraction Duration (sec) Contraction Intensity Uterine Resting Tone Contraction Pattern    07/18/19 1120  external tocotransducer;per patient report;palpation  3-6  40-80  mild by palpation Pt. does not feel UCs  soft by palpation  Irregular    07/18/19 1100  external tocotransducer;per patient report  3-5  40-60  mild by palpation Pt. does not feel UCs  soft by palpation  Irregular    07/18/19 1028  external tocotransducer;per patient report;palpation  --  --  --  soft by palpation  --    07/17/19 2230  per patient report;palpation;external tocotransducer  0  --  no contractions  soft by palpation  Irritability    07/17/19 1527  palpation;external tocotransducer  0  --  no contractions  soft by palpation  --    07/17/19 1500  palpation;external tocotransducer  --  --  no contractions  soft by palpation  --    07/17/19 1430  external tocotransducer  --  --  no contractions  soft by palpation  Irritability    07/17/19 1400  external tocotransducer  --  --  mild by palpation  soft by palpation  Irregular    07/17/19 1330  external tocotransducer  x1  --  mild by palpation  soft by palpation  Irregular    07/17/19 1300  external tocotransducer  --  --  no contractions  soft by palpation  Irritability    07/17/19 1230  external tocotransducer  x2  --  mild by palpation  soft by palpation  Irregular    07/17/19 1200  external tocotransducer  x1  --  mild by palpation  soft by palpation  Irregular    07/17/19 1130  external tocotransducer  x2  --  mild by palpation  soft by palpation  Irregular    07/17/19 1100  external tocotransducer  --  --  no contractions  soft by palpation  Irregular    07/17/19 1030  external tocotransducer  x2  --  mild by palpation  soft by palpation  Irregular    07/17/19 1000  external tocotransducer  x2  --  mild by palpation  soft by  palpation  Irritability    07/17/19 0830  palpation;external tocotransducer  --  --  no contractions  soft by palpation  --    07/17/19 0820  external tocotransducer  --  --  no contractions  soft by palpation  Irritability    07/17/19 0800  external tocotransducer  --  --  no contractions  soft by palpation  Irritability    07/17/19 0730  external tocotransducer  --  --  no contractions  soft by palpation  Irritability    07/17/19 0700  external tocotransducer;per patient report  --  --  no contractions  soft by palpation  --    07/17/19 0630  external tocotransducer  --  --  no contractions  soft by palpation  --    07/17/19 0600  external tocotransducer  --  --  no contractions  soft by palpation  --    07/17/19 0530  external tocotransducer;per patient report;palpation  --  --  no contractions  soft by palpation  --    07/17/19 0500  external tocotransducer  --  --  no contractions  soft by palpation  --    07/17/19 0430  external tocotransducer;per patient report;palpation  --  --  no contractions  soft by palpation  --    07/17/19 0400  external tocotransducer;per patient report;palpation  occasional  --  mild by palpation  soft by palpation  --    07/17/19 0330  external tocotransducer;per patient report;palpation  occasional  --  mild by palpation  soft by palpation  --    07/17/19 0300  external tocotransducer;per patient report;palpation  occasional  --  mild by palpation  soft by palpation  --    07/17/19 0230  external tocotransducer;per patient report;palpation  occasional  --  mild by palpation  soft by palpation  --    07/17/19 0200  external tocotransducer;per patient report;palpation  occasional  --  mild by palpation  soft by palpation  --    07/17/19 0130  external tocotransducer;per patient report;palpation  occasional  --  mild by palpation  soft by palpation  --    07/17/19 0100  external tocotransducer;per patient report  occasional  --  mild by palpation  soft by palpation  --    07/17/19  0030  external tocotransducer;palpation;per patient report  occasional  --  mild by palpation  soft by palpation  --    07/17/19 0000  external tocotransducer;palpation;per patient report  --  --  no contractions no ctx palpated or felt by pt  soft by palpation  --    07/16/19 2330  external tocotransducer  6-8  70-90  mild by palpation  soft by palpation  --    07/16/19 2300  external tocotransducer  3-5.5    mild by palpation  soft by palpation  --    07/16/19 2230  external tocotransducer  3-5.5  70-90  mild by palpation  soft by palpation  --    07/16/19 2200  external tocotransducer  6-8    mild by palpation  soft by palpation  --    07/16/19 2130  external tocotransducer  5-7  60-80  mild by palpation  soft by palpation  --    07/16/19 2100  external tocotransducer  3.5-6  70-90  mild by palpation  soft by palpation  --    07/16/19 2030  external tocotransducer  5-7  60-80  mild by palpation  soft by palpation  --    07/16/19 2000  external tocotransducer;palpation;per patient report  3.5-6  60-90  mild by palpation  soft by palpation  --    07/16/19 1930  external tocotransducer  4-6  70-90  mild by palpation  soft by palpation  --    07/16/19 1900  external tocotransducer  2-4  60-90  mild by palpation  soft by palpation  --    07/16/19 1830  external tocotransducer  2-7  70-90  mild by palpation  soft by palpation  --    07/16/19 1800  external tocotransducer  2-10    mild by palpation  soft by palpation  --    07/16/19 1730  external tocotransducer  2-8    mild by palpation  soft by palpation  Irregular    07/16/19 1700  external tocotransducer;palpation  2-6    mild by palpation  soft by palpation  Irregular        Labor Pain (last 2 days)     Date/Time Pain Rating (0-10): Rest Pain Rating (0-10): Activity Pain Management Interventions    07/18/19 1020  0  0  --    07/17/19 1618  0  0  --    07/17/19 1254  0  --  --    07/17/19 0730  0  0  --    07/17/19 0500  1  1  --     07/16/19 1924  5  5  --

## 2019-07-18 NOTE — PROGRESS NOTES
Hospital Day # 3      Patient name: Nannette Godinez  Age: 19 y.o.  Sex: female  YOB: 2000   MRN: [id number]  Admission Date: 2019    Gestational age: 31w1d    Diagnosis:   Pregnancy     labor      Subjective:    19 y.o.  @ 31w1d - admitted on Tuesday evening with regular contractions, threatened  labor. Received betamethasone for FLM and Magnesium for both tocolysis and neuro protection.    Overnight, she has no complaints.   No cramping  No leaking   No bleeding   Good fetal movement.   Per patient told by Dr. Sandoval would be discharged today. (still less than 48 hours) - will be at 6 PM tonight or so.     Objective:      Temp:  [98.1 °F (36.7 °C)-98.5 °F (36.9 °C)] 98.2 °F (36.8 °C)  Heart Rate:  [61-93] 61  Resp:  [16-18] 16  BP: ()/(42-64) 108/48    Exam:     Gen : WN, WD female in NAD     Abdomen: Soft, non-tender and gravid to consistent with early third trimester     Extremities: No evidence of DVT, Edema 1+     Reflexes: 2+ bilaterally, no clonus     Ultrasound:      Cervix 1.5 cm   AGA - 52% (3#13oz)   Normal LESVIA at 19 cm   Cephalic and No previa   BPP      Labs:  Lab Results   Component Value Date    WBC 13.14 (H) 2019    HGB 10.8 (L) 2019    HCT 32.2 (L) 2019    MCV 95.3 2019     2019     Urinalysis negative   FFN - Positive   UDS - not in system (? Done)   GBS negative   ABS - residual rhogam     Assessment:      1) Pregnancy at 31w1d  2) Threatened  labor with significant risk factors of short cervix and +FFN -- However MFM has seen (see note with ultrasound) and she is potentially candidate for outpatient management. We will go ahead and observe for a few more hours as was having contractions on NST (but not perceptible to patient).  If those resolve with hydration and voiding, still may consider. If not will need to at least observe until tomorrow.       Plan:       Continue inpatient today due to uterine  irritability with risk factors for  labor. If stable overnight without intervention, can consider for outpatient management.     I spent 20 of 25 minutes face to face with the patient, during this time we reviewed her medical diagnosis and our treatment plan as outlined above.        Fortunato Vera MD  2019  10:46 AM

## 2019-07-18 NOTE — NON STRESS TEST
Nannette Godinez, a  at 31w1d with an ANDREA of 2019, by Ultrasound, was seen at [facility] for a nonstress test.    Chief Complaint   Patient presents with   • Abdominal Cramping     Pt reports cramping that started around 1000 this morning that comes and goes frequently. Pt denies VB and LOF. Pt reports less FM that she is used to since this morning.       Patient Active Problem List   Diagnosis   • Primigravida   • Pregnancy   •  labor       Start Time:   Stop Time:     Interpretation A  Nonstress Test Interpretation A: Reactive (19 : Orin Rhodes, RN)

## 2019-07-18 NOTE — PLAN OF CARE
Problem: Patient Care Overview  Goal: Plan of Care Review  Outcome: Ongoing (interventions implemented as appropriate)   19 [id number]   Coping/Psychosocial   Plan of Care Reviewed With --  --  family   Plan of Care Review   Progress improving --  --    OTHER   Outcome Summary --  Pt off MgSO4 and on antepartum. Denies LOF, VB, ctx. Reports good FM. Resting comfortably. --      Goal: Individualization and Mutuality   19   Individualization   Patient Specific Goals (Include Timeframe) to stop harsh and remain pregnant   Patient Specific Interventions mag sulfate   Mutuality/Individual Preferences   What Anxieties, Fears, Concerns, or Questions Do You Have About Your Care? nervous about having an early baby    How Would You and/or Your Support Person Like to Participate in Your Care? be present at all times and include in POC.    Mutuality/Individual Preferences   How to Address Anxieties/Fears explain what is going on and keep informed     Goal: Discharge Needs Assessment  Outcome: Ongoing (interventions implemented as appropriate)   19 06   Discharge Needs Assessment   Readmission Within the Last 30 Days no previous admission in last 30 days   Concerns to be Addressed no discharge needs identified   Patient/Family Anticipates Transition to home;home with family   Patient/Family Anticipated Services at Transition none   Transportation Anticipated car, drives self;family or friend will provide   Anticipated Changes Related to Illness none   Equipment Needed After Discharge none   Disability   Equipment Currently Used at Home none     Goal: Interprofessional Rounds/Family Conf  Outcome: Ongoing (interventions implemented as appropriate)   19   Interdisciplinary Rounds/Family Conf   Participants patient;nursing;physician       Problem:  Labor (Adult,Obstetrics,Pediatric)  Goal: Signs and Symptoms of Listed Potential Problems Will be Absent, Minimized  or Managed ( Labor)  Outcome: Ongoing (interventions implemented as appropriate)   19 0630   Goal/Outcome Evaluation   Problems Assessed ( Labor) all   Problems Present ( Labor) other (see comments)  (contractions)       Problem: Prenatal Patient, Hospitalized (Adult,Obstetrics,Pediatric)  Goal: Signs and Symptoms of Listed Potential Problems Will be Absent, Minimized or Managed (Prenatal Patient, Hospitalized)  Outcome: Ongoing (interventions implemented as appropriate)   19 1755   Goal/Outcome Evaluation   Problems Assessed (Prenatal Patient) all   Problems Present (Prenatal Patient)  labor

## 2019-07-19 VITALS
RESPIRATION RATE: 18 BRPM | HEIGHT: 67 IN | TEMPERATURE: 98.2 F | WEIGHT: 136 LBS | BODY MASS INDEX: 21.35 KG/M2 | SYSTOLIC BLOOD PRESSURE: 120 MMHG | DIASTOLIC BLOOD PRESSURE: 61 MMHG | OXYGEN SATURATION: 99 % | HEART RATE: 85 BPM

## 2019-07-19 LAB — BACTERIA SPEC AEROBE CULT: NORMAL

## 2019-07-19 PROCEDURE — 59025 FETAL NON-STRESS TEST: CPT | Performed by: OBSTETRICS & GYNECOLOGY

## 2019-07-19 PROCEDURE — 59025 FETAL NON-STRESS TEST: CPT

## 2019-07-19 PROCEDURE — 99238 HOSP IP/OBS DSCHRG MGMT 30/<: CPT | Performed by: OBSTETRICS & GYNECOLOGY

## 2019-07-19 RX ADMIN — Medication 1 TABLET: at 10:16

## 2019-07-19 RX ADMIN — IBUPROFEN 400 MG: 400 TABLET, FILM COATED ORAL at 10:16

## 2019-07-19 NOTE — DISCHARGE SUMMARY
Date of Discharge:  2019    Discharge Diagnosis: 1)  contractions                                          2) Short cervix and +FFN     Presenting Problem/History of Present Illness  Pregnancy [Z34.90]   labor [O60.00]       Hospital Course  Patient is a 19 y.o. female  31w2d presented with abdominal pains. Found to have +FFN and short cervix. Given BMZ, magnesium. Improved and now wishes to trial outpatient management. Will discharge on pelvic rest, limited activity and reviewed dynamed and found suggestion to use 200 mg of progesterone vaginal suppository until 34 weeks.  Discharge and instructions were reviewed and she voiced understanding.     Procedures Performed         Consults:   Consults     Date and Time Order Name Status Description    2019 1839 Inpatient Neonatology Consult            Pertinent Test Results: +FFN, cervical length < 2 cm     Condition on Discharge:  Stable     Discharge Disposition  Home or Self Care    Discharge Medications     Discharge Medications      New Medications      Instructions Start Date   progesterone 200 MG capsule  Commonly known as:  PROMETRIUM   200 mg, Vaginal, Daily         Continue These Medications      Instructions Start Date   ferrous sulfate 325 (65 FE) MG EC tablet   325 mg, Oral, 2 Times Daily With Meals      PNV PRENATAL PLUS MULTIVIT+DHA 27-1 & 312 MG misc   TAKE 1 TAB & 1 CAPSULE BY MOUTH ONE TIME A DAY         Stop These Medications    amoxicillin 500 MG capsule  Commonly known as:  AMOXIL            Discharge Diet:   Diet Instructions     Advance Diet As Tolerated            Activity at Discharge:   Activity Instructions     Other Instructions (Specify)      Limit overall activity    Pelvic Rest            Follow-up Appointments  Future Appointments   Date Time Provider Department Center   2019 10:30 AM Cezar Peterson MD [facility] None     Additional Instructions for the Follow-ups that You Need to Schedule     Discharge  Follow-up with Specialty: Dr. Peterson; 1 Week   As directed      Specialty:  Dr. Peterson    Follow Up:  1 Week               Test Results Pending at Discharge   Order Current Status    Group B Streptococcus Culture - Swab, Vaginal/Rectum Preliminary result           Fortunato Vera MD  07/19/19  11:41 AM

## 2019-07-19 NOTE — PLAN OF CARE
Problem: Patient Care Overview  Goal: Plan of Care Review  Outcome: Ongoing (interventions implemented as appropriate)   07/18/19 1738 07/18/19 2030   Coping/Psychosocial   Plan of Care Reviewed With --  patient;parent   Plan of Care Review   Progress improving --    OTHER   Outcome Summary RNST; no LOF, no VB, +FM, +UCs on Yonah, but pt. does not feel them; patient to stay inpatient at this time; doing very well overall --      Goal: Individualization and Mutuality  Outcome: Ongoing (interventions implemented as appropriate)   07/17/19 0630 07/18/19 1738   Individualization   Patient Specific Preferences --  Would like to be d/c home soon   Patient Specific Goals (Include Timeframe) to stop harsh and remain pregnant --    Patient Specific Interventions --  U/s scheduled for tomorrow   Mutuality/Individual Preferences   What Anxieties, Fears, Concerns, or Questions Do You Have About Your Care? nervous about having an early baby  --    How Would You and/or Your Support Person Like to Participate in Your Care? be present at all times and include in POC.  --    Mutuality/Individual Preferences   How to Address Anxieties/Fears explain what is going on and keep informed --      Goal: Discharge Needs Assessment  Outcome: Ongoing (interventions implemented as appropriate)   07/17/19 0630   Discharge Needs Assessment   Readmission Within the Last 30 Days no previous admission in last 30 days   Concerns to be Addressed no discharge needs identified   Patient/Family Anticipates Transition to home;home with family   Patient/Family Anticipated Services at Transition none   Transportation Anticipated car, drives self;family or friend will provide   Anticipated Changes Related to Illness none   Equipment Needed After Discharge none   Disability   Equipment Currently Used at Home none     Goal: Interprofessional Rounds/Family Conf  Outcome: Ongoing (interventions implemented as appropriate)   07/18/19 1738   Interdisciplinary  Rounds/Family Conf   Participants nursing;patient;physician       Problem:  Labor (Adult,Obstetrics,Pediatric)  Goal: Signs and Symptoms of Listed Potential Problems Will be Absent, Minimized or Managed ( Labor)  Outcome: Ongoing (interventions implemented as appropriate)   19 1738   Goal/Outcome Evaluation   Problems Assessed ( Labor) all   Problems Present ( Labor) none       Problem: Prenatal Patient, Hospitalized (Adult,Obstetrics,Pediatric)  Goal: Signs and Symptoms of Listed Potential Problems Will be Absent, Minimized or Managed (Prenatal Patient, Hospitalized)  Outcome: Ongoing (interventions implemented as appropriate)   19 1738   Goal/Outcome Evaluation   Problems Assessed (Prenatal Patient) all   Problems Present (Prenatal Patient)  labor

## 2019-07-19 NOTE — PAYOR COMM NOTE
"[facility]   &  [facility] Brenda Linares  [address]    [address] [place] [address]    [place] [address]    [employer] Gold  Utilization Review/Room Reservations  Phone: [phone], [phone] or [phone]  Fax: [phone]  Email: [email or web address]  Please call, fax back, or email with authorization or any questions! Thanks!        CLINICAL  AUTH#[id number]        This fax contains any of the following:  Face Sheet, H&P, progress notes, consults, orders, meds, lab results, labor record, vitals, delivery worksheet, op note, d/c summary.      Radha Avalos (19 y.o. Female)     Date of Birth Social Security Number Address Home Phone MRN    2000  [address] [facility] [address] [phone] [id number]    Hindu Marital Status          Non-Druze Single       Admission Date Admission Type Admitting Provider Attending Provider Department, Room/Bed    19 Elective Hernesto Sandoval MD Penta, John R, MD [facility] ANTEPARTUM UNIT, 3305/1    Discharge Date Discharge Disposition Discharge Destination         Home or Self Care              Attending Provider:  Cezar Peterson MD    Allergies:  No Known Allergies    Isolation:  None   Infection:  None   Code Status:  CPR    Ht:  170.2 cm (67\")   Wt:  61.7 kg (136 lb)    Admission Cmt:  None   Principal Problem:   labor [O60.00]                 Active Insurance as of 2019     Primary Coverage     Payor Plan Insurance Group Employer/Plan Group    [employer]HEM BLUE CROSS ANTHEM BLUE CROSS BLUE SHIELD PPO [id number]     Payor Plan Address Payor Plan Phone Number Payor Plan Fax Number Effective Dates    [address] [phone]  2019 - None Entered    [facility] [id number]       Subscriber Name Subscriber Birth Date Member ID       RADHA AVALOS 2000 [id number]           Secondary Coverage     Payor Plan Insurance Group Employer/Plan Group    " [facility]      Payor Plan Address Payor Plan Phone Number Payor Plan Fax Number Effective Dates    [address] [phone]  10/1/2015 - None Entered    [facility] [id number]       Subscriber Name Subscriber Birth Date Member ID       RADHA AVALOS 2000 [id number]                 Emergency Contacts      (Rel.) Home Phone Work Phone Mobile Phone    PETRA AVALOS (Father) -- -- [phone]               History & Physical      Hernesto Sandoval MD at 2019  6:48 PM                Patient Care Team:  Angie Morton APRN as PCP - General (Nurse Practitioner)    Chief complaint - cramping and pelvic pressure    Subjective     History of Present Illness - Patient is a 19 year old  at 30+ weeks who presents to labor and delivery with complaints of increased cramping and pressure today.  Patient denies bleeding or spotting.  She had an episode of cramping three weeks ago and presented to labor and delivery.  She was evaluated and  labor was ruled out and she was sent home.  After having increasing and progressive symptoms today, she notified her doctor who recommended that she present to labor and delivery.  She reports good FM.  She has been hydrating well and has had no recent sexual activity.    Review of Systems   Constitutional: Negative for chills and fever.   HENT: Negative for dental problem and drooling.    Eyes: Negative for photophobia and visual disturbance.   Respiratory: Negative for shortness of breath and wheezing.    Cardiovascular: Negative for chest pain and palpitations.   Gastrointestinal: Negative for nausea and vomiting.   Genitourinary: Negative for dysuria and frequency.   Musculoskeletal: Negative for gait problem and joint swelling.   Skin: Negative for pallor and rash.   Neurological: Negative for seizures and speech difficulty.   Hematological: Negative for adenopathy. Does not bruise/bleed easily.   Psychiatric/Behavioral: Negative  for behavioral problems and confusion.        Past Medical History:   Diagnosis Date   • Deliberate self-cutting     Pt has old scars on both legs; no problems since 15     History reviewed. No pertinent surgical history.  Family History   Problem Relation Age of Onset   • Hypertension Paternal Uncle      Social History     Tobacco Use   • Smoking status: Former Smoker     Types: Cigarettes   • Smokeless tobacco: Never Used   Substance Use Topics   • Alcohol use: No     Frequency: Never   • Drug use: No     Medications Prior to Admission   Medication Sig Dispense Refill Last Dose   • ferrous sulfate 325 (65 FE) MG EC tablet Take 1 tablet by mouth 2 (Two) Times a Day With Meals. 60 tablet 11 7/15/2019 at 2000   • Prenatal Vit-Fe Fum-FA-Omega (PNV PRENATAL PLUS MULTIVIT+DHA) 27-1 & 312 MG misc TAKE 1 TAB & 1 CAPSULE BY MOUTH ONE TIME A DAY  3 7/15/2019 at 2000   • amoxicillin (AMOXIL) 500 MG capsule Take 2 capsules by mouth 3 (Three) Times a Day. 21 capsule 0 6/25/2019 at 2200     Allergies:  Patient has no known allergies.    Objective      Vital Signs  Temp:  [98.2 °F (36.8 °C)] 98.2 °F (36.8 °C)  Heart Rate:  [88] 88  Resp:  [17] 17  BP: (112)/(57) 112/57    Physical Exam   Constitutional: She appears well-developed and well-nourished.   HENT:   Right Ear: External ear normal.   Left Ear: External ear normal.   Nose: Nose normal.   Eyes: Conjunctivae are normal.   Neck: Normal range of motion. Neck supple. No thyromegaly present.   Cardiovascular: Normal rate.   Pulmonary/Chest: Effort normal. No stridor.   Abdominal: Soft. There is no tenderness. There is no guarding.   Genitourinary:   Genitourinary Comments: Cervix 60/1/-3   Musculoskeletal: Normal range of motion.   Neurological: She is alert. Coordination normal.   Skin: Skin is warm and dry.   Psychiatric: She has a normal mood and affect. Her behavior is normal. Judgment and thought content normal.   Vitals reviewed.      Results Review:   Urine tox,  urinalysis negative.              Fetal Fibronectin positive.    NST - Category 1.  [place] with regular uterine contractions.      Assessment/Plan       Pregnancy     labor      Assessment & Plan  IUP at 30+ weeks with  labor  - Patient with cervical dilation and regular contractions.  Her FFN testing was also positive.  Her risks of  labor/delivery are increased.  I recommended magnesium sulfate for tocolysis with betamethasone administration for fetal lung maturity.  Will monitor closely.  Likely sonogram in morning for cervical length and growth.  - Tobacco use.  Discussed with patient that smoking impedes placental function which increases risks of complications including  labor and birth, IUGR, stillbirth and SGA.  Patient strongly advised to quit smoking for the remainder of her pregnancy.    I discussed the patients findings and my recommendations with patient and family    Hernesto Sandoval MD  19  6:48 PM    Time: More than 50% of time spent in counseling and coordination of care:  Total face-to-face/floor time 50 min.  Time spent in counseling 25 min. Counseling included the following topics: management of  labor, risks for progression and delivery and counseling on smoking cessation.    Electronically signed by Hernesto Sandoval MD at 2019  7:05 PM       Hospital Medications (all)       Dose Frequency Start End    betamethasone acetate-betamethasone sodium phosphate (CELESTONE SOLUSPAN) injection 12 mg 12 mg Every 24 Hours 2019    Sig - Route: Inject 2 mL into the appropriate muscle as directed by prescriber Daily. - Intramuscular    ferrous sulfate tablet 325 mg 325 mg Daily With Dinner 2019     Sig - Route: Take 1 tablet by mouth Daily With Dinner. - Oral    ibuprofen (ADVIL,MOTRIN) tablet 400 mg 400 mg 2 Times Daily 2019    Sig - Route: Take 1 tablet by mouth 2 (Two) Times a Day. - Oral    ibuprofen  "(ADVIL,MOTRIN) tablet 600 mg 600 mg Once 7/17/2019 7/17/2019    Sig - Route: Take 1 tablet by mouth 1 (One) Time. - Oral    lactated ringers bolus 1,000 mL 1,000 mL As Needed 7/16/2019     Sig - Route: Infuse 1,000 mL into a venous catheter As Needed (FHR decelerations or decreased variability). - Intravenous    lactated ringers infusion 75 mL/hr Continuous 7/16/2019     Sig - Route: Infuse 75 mL/hr into a venous catheter Continuous. - Intravenous    magnesium sulfate 20 GM/500ML infusion 2 g/hr Continuous 7/16/2019 7/20/2019    Sig - Route: Infuse 2 g/hr into a venous catheter Continuous. - Intravenous    Linked Group 1:  \"Followed by\" Linked Group Details        magnesium sulfate bolus from bag 0.04 g/mL 4 g 4 g Once 7/16/2019 7/16/2019    Sig - Route: Infuse 100 mL into a venous catheter 1 (One) Time. - Intravenous    Linked Group 1:  \"Followed by\" Linked Group Details        prenatal (CLASSIC) vitamin tablet 1 tablet 1 tablet Daily 7/18/2019     Sig - Route: Take 1 tablet by mouth Daily. - Oral    sodium chloride 0.9 % flush 3 mL 3 mL Every 12 Hours Scheduled 7/16/2019     Sig - Route: Infuse 3 mL into a venous catheter Every 12 (Twelve) Hours. - Intravenous    sodium chloride 0.9 % flush 3-10 mL 3-10 mL As Needed 7/16/2019     Sig - Route: Infuse 3-10 mL into a venous catheter As Needed for Line Care. - Intravenous    magnesium sulfate in D5W 1g/100mL (PREMIX) (Discontinued) 3 g/hr Continuous 7/16/2019 7/16/2019    Sig - Route: Infuse 3 g/hr into a venous catheter Continuous. - Intravenous    Reason for Discontinue: *Error    magnesium sulfate in D5W 1g/100mL (PREMIX) (Discontinued) 3 g/hr Continuous 7/16/2019 7/18/2019    Sig - Route: Infuse 3 g/hr into a venous catheter Continuous. - Intravenous    Reason for Discontinue: Duplicate order          Lab Results (last 72 hours)     Procedure Component Value Units Date/Time    URINE DRUG SCREEN PLUS BUPRENORPHINE - [[id number]] Collected:  07/16/19 1722     " Updated:  07/18/19 2007    Narrative:       The following orders were created for panel order URINE DRUG SCREEN PLUS BUPRENORPHINE -.  Procedure                               Abnormality         Status                     ---------                               -----------         ------                     Urine Drug Screen - Urin...[[id number]]  Normal              Final result               Buprenorphine Screen Uri...[[id number]]                      Final result                 Please view results for these tests on the individual orders.    Buprenorphine Screen Urine - Urine, Clean Catch [[id number]] Collected:  07/16/19 1722    Specimen:  Urine, Clean Catch Updated:  07/18/19 2007     Buprenorphine, Urine Negative ng/mL     Narrative:       Performed at:  [facility]  [address] [facility], [facility], [place]  [id number]  : Cl Heredia PhD, Phone:  [phone]    Group B Streptococcus Culture - Swab, Vaginal/Rectum [[id number]]  (Normal) Collected:  07/17/19 1231    Specimen:  Swab from Vaginal/Rectum Updated:  07/18/19 0930     Group B Strep Culture No Group B Streptococcus isolated    Magnesium [[id number]]  (Abnormal) Collected:  07/17/19 1103    Specimen:  Blood Updated:  07/17/19 1217     Magnesium 7.4 mg/dL     CBC & Differential [[id number]] Collected:  07/16/19 1852    Specimen:  Blood Updated:  07/16/19 1930    Narrative:       The following orders were created for panel order CBC & Differential.  Procedure                               Abnormality         Status                     ---------                               -----------         ------                     CBC Auto Differential[[id number]]        Abnormal            Final result                 Please view results for these tests on the individual orders.    CBC Auto Differential [[id number]]  (Abnormal) Collected:  07/16/19 1852    Specimen:  Blood Updated:  07/16/19 1930     WBC 13.14 10*3/mm3      RBC 3.38 10*6/mm3      Hemoglobin 10.8  g/dL      Hematocrit 32.2 %      MCV 95.3 fL      MCH 32.0 pg      MCHC 33.5 g/dL      RDW 12.1 %      RDW-SD 41.9 fl      MPV 11.1 fL      Platelets 146 10*3/mm3      Neutrophil % 73.3 %      Lymphocyte % 18.1 %      Monocyte % 5.1 %      Eosinophil % 0.5 %      Basophil % 0.3 %      Immature Grans % 2.7 %      Neutrophils, Absolute 9.63 10*3/mm3      Lymphocytes, Absolute 2.38 10*3/mm3      Monocytes, Absolute 0.67 10*3/mm3      Eosinophils, Absolute 0.07 10*3/mm3      Basophils, Absolute 0.04 10*3/mm3      Immature Grans, Absolute 0.35 10*3/mm3      nRBC 0.0 /100 WBC     Urine Drug Screen - Urine, Clean Catch [[id number]]  (Normal) Collected:  07/16/19 1722    Specimen:  Urine, Clean Catch Updated:  07/16/19 1841     Amphet/Methamphet, Screen Negative     Barbiturates Screen, Urine Negative     Benzodiazepine Screen, Urine Negative     Cocaine Screen, Urine Negative     Opiate Screen Negative     THC, Screen, Urine Negative     Methadone Screen, Urine Negative     Oxycodone Screen, Urine Negative    Narrative:       Negative Thresholds For Drugs Screened:     Amphetamines               500 ng/ml   Barbiturates               200 ng/ml   Benzodiazepines            100 ng/ml   Cocaine                    300 ng/ml   Methadone                  300 ng/ml   Opiates                    300 ng/ml   Oxycodone                  100 ng/ml   THC                        50 ng/ml    The Normal Value for all drugs tested is negative. This report includes final unconfirmed screening results to be used for medical treatment purposes only. Unconfirmed results must not be used for non-medical purposes such as employment or legal testing. Clinical consideration should be applied to any drug of abuse test, particulary when unconfirmed results are used.    Urinalysis With Culture If Indicated - Urine, Random Void [[id number]]  (Abnormal) Collected:  07/16/19 1722    Specimen:  Urine, Random Void Updated:  07/16/19 1804     Color, UA Yellow      Appearance, UA Clear     pH, UA 6.5     Specific Gravity, UA <=1.005     Glucose, UA Negative     Ketones, UA 15 mg/dL (1+)     Bilirubin, UA Negative     Blood, UA Negative     Protein, UA Negative     Leuk Esterase, UA Negative     Nitrite, UA Negative     Urobilinogen, UA 0.2 E.U./dL    Narrative:       Urine microscopic not indicated.    Fetal Fibronectin - Vaginal Fluid, Cervix [[id number]]  (Abnormal) Collected:  19 1626    Specimen:  Vaginal Fluid from Cervix Updated:  19 1740     Fetal Fibronectin Positive     Bloody Specimen? no               Physician Progress Notes (last 72 hours) (Notes from 2019  1:23 PM through 2019  1:23 PM)      Fortunato Vera MD at 2019 11:31 AM          Hospital Day # 4    Patient name: Nannette Godinez  Age: 19 y.o.  Sex: female  YOB: 2000   MRN: [id number]  Admission Date: 2019    Gestational age: 31w2d    Diagnosis:    labor    Pregnancy      Subjective:    19 y.o.  @ 31w2d - admitted on Tuesday evening with regular contractions, threatened  labor. Received betamethasone for FLM and Magnesium for both tocolysis and neuro protection.    Overnight, she has no complaints.   No cramping  No leaking   No bleeding   Good fetal movement.     Objective:      Temp:  [97.9 °F (36.6 °C)-98.2 °F (36.8 °C)] 98.2 °F (36.8 °C)  Heart Rate:  [73-85] 85  Resp:  [18] 18  BP: (116-120)/(61-64) 120/61    Exam:     Gen : WN, WD female in NAD     Abdomen: Soft, non-tender and gravid to consistent with early third trimester     Extremities: No evidence of DVT, Edema 1+     Reflexes: 2+ bilaterally, no clonus     Ultrasound:      Cervix 1.5 cm   AGA - 52% (3#13oz)   Normal LESVIA at 19 cm   Cephalic and No previa   BPP      Labs:  Lab Results   Component Value Date    WBC 13.14 (H) 2019    HGB 10.8 (L) 2019    HCT 32.2 (L) 2019    MCV 95.3 2019     2019     Urinalysis negative   FFN -  Positive   UDS - not in system (? Done)   GBS negative   ABS - residual rhogam     Assessment:      1) Pregnancy at 31w2d  2) Threatened  labor with significant risk factors of short cervix and +FFN -- However MFGARY has seen (see note with ultrasound) and she is potentially candidate for outpatient management.   Still has episodes of uterine irritability, but does not require medication or intervention, so should be fine to do as outpatient.       Plan:       Discharge home  FMC BID  PTL warnings   Pelvic rest   Limit overall activity   Keep Appointment with Dr. Peterson next week.      Fortunato Vera MD  2019  11:31 AM    Electronically signed by Fortunato Vera MD at 2019 11:34 AM     Fortunato Vera MD at 2019 10:46 AM          Hospital Day # 3      Patient name: Nannette Godinez  Age: 19 y.o.  Sex: female  YOB: 2000   MRN: [id number]  Admission Date: 2019    Gestational age: 31w1d    Diagnosis:   Pregnancy     labor      Subjective:    19 y.o.  @ 31w1d - admitted on Tuesday evening with regular contractions, threatened  labor. Received betamethasone for FLM and Magnesium for both tocolysis and neuro protection.    Overnight, she has no complaints.   No cramping  No leaking   No bleeding   Good fetal movement.   Per patient told by Dr. Sandoval would be discharged today. (still less than 48 hours) - will be at 6 PM tonight or so.     Objective:      Temp:  [98.1 °F (36.7 °C)-98.5 °F (36.9 °C)] 98.2 °F (36.8 °C)  Heart Rate:  [61-93] 61  Resp:  [16-18] 16  BP: ()/(42-64) 108/48    Exam:     Gen : WN, WD female in NAD     Abdomen: Soft, non-tender and gravid to consistent with early third trimester     Extremities: No evidence of DVT, Edema 1+     Reflexes: 2+ bilaterally, no clonus     Ultrasound:      Cervix 1.5 cm   AGA - 52% (3#13oz)   Normal LESVIA at 19 cm   Cephalic and No previa   BPP 8/8     Labs:  Lab Results   Component Value Date     WBC 13.14 (H) 2019    HGB 10.8 (L) 2019    HCT 32.2 (L) 2019    MCV 95.3 2019     2019     Urinalysis negative   FFN - Positive   UDS - not in system (? Done)   GBS negative   ABS - residual rhogam     Assessment:      1) Pregnancy at 31w1d  2) Threatened  labor with significant risk factors of short cervix and +FFN -- However MFM has seen (see note with ultrasound) and she is potentially candidate for outpatient management. We will go ahead and observe for a few more hours as was having contractions on NST (but not perceptible to patient).  If those resolve with hydration and voiding, still may consider. If not will need to at least observe until tomorrow.       Plan:       Continue inpatient today due to uterine irritability with risk factors for  labor. If stable overnight without intervention, can consider for outpatient management.     I spent 20 of 25 minutes face to face with the patient, during this time we reviewed her medical diagnosis and our treatment plan as outlined above.      Fortunato Vera MD  2019  10:46 AM    Electronically signed by Fortunato Vera MD at 2019 10:57 AM     Hernesto Sandoval MD at 2019  8:16 AM           LOS: 0 days   Patient Care Team:  Angie Morton APRN as PCP - General (Nurse Practitioner)    Chief Complaint:  contractions    Subjective     History of Present Illness - 19 year old primigravid at 31 weeks admitted for  labor last night.  Patient with regular contractions, abdominal/pelvic pain and dilated cervix.  She was given betamethasone and Magnesium sulfate.  Contractions have subsided and patient feels improved.  She denies bleeding or spotting.  No leakage of fluid.  She reports excellent FM.    Subjective    History taken from: patient    Objective     Vital Signs  Temp:  [97.7 °F (36.5 °C)-98.2 °F (36.8 °C)] 97.7 °F (36.5 °C)  Heart Rate:  [69-88] 82  Resp:   "[16-18] 17  BP: ()/(38-65) 114/58    Objective:  General Appearance:  Comfortable and well-appearing.    Vital signs: (most recent): Blood pressure 114/58, pulse 82, temperature 97.7 °F (36.5 °C), temperature source Oral, resp. rate 17, height 170.2 cm (67\"), weight 61.7 kg (136 lb), last menstrual period 2018, SpO2 100 %, currently breastfeeding.  Vital signs are normal.    Output: Producing urine.    HEENT: Normal HEENT exam.    Lungs:  Normal effort.    Heart: Normal rate.    Abdomen: Abdomen is soft.  There is no abdominal tenderness.     Extremities: Normal range of motion.    Neurological: Patient is alert.  Patient has normal coordination.    Skin:  Warm and dry.      Results Review:     I reviewed the patient's new clinical results.    NST - Category 1  Medication Review: magnesium sulfate and betamethasone    Assessment/Plan       Pregnancy     labor      Assessment & Plan  IUP at 31 weeks with  labor  - Symptoms have significantly resolved.  She feels improved and no evidence of regular contractions.  Recommend cervical length and growth scan today.  Discussed significance of cervical length with patient.  If cervix is shortened, consideration for extended inpatient management.  If normal cervical length and no apparent progression of  labor, may consider outpatient follow up.  Consider discontinuing tocolysis after 24 to 48 hours.  - Fetal status reassuring overall.    Hernesto Sandoval MD  19  8:16 AM    Time: More than 50% of time spent in counseling and coordination of care:  Total face-to-face/floor time 30 min.  Time spent in counseling 20 min. Counseling included the following topics: management of  labor        Electronically signed by Hernesto Sandoval MD at 2019  8:24 AM           Maternal Vitals (last 3 days)     Date/Time   Temp   Pulse   Resp   BP   SpO2    19 0950   98.2 (36.8)   85   18   120/61   99    19   " 97.9 (36.6)   73   18   116/64   99    07/18/19 1020   98.2 (36.8)   61   16   108/48   98    07/18/19 0920   --   --   --   --   --    Comment rows:    OBSERV: Pt. sleeping and asking that I come back to evaluate her. at 07/18/19 0920    07/18/19 0845   --   --   --   --   --    Comment rows:    OBSERV: Pt. sleeping at 07/18/19 0845    07/18/19 0419   98.3 (36.8)   71   16   100/43   98    07/17/19 2350   98.4 (36.9)   80   16   95/42   98    07/17/19 2004   --   --   --   --   --    Comment rows:    OBSERV: up to shower at 07/17/19 2004 07/17/19 1900   98.5 (36.9)   89   18   108/63   100    07/17/19 1630   98.1 (36.7)   70   18   123/64   98    07/17/19 1518   --   70   --   --   100    07/17/19 1515   --   --   18   --   --    07/17/19 1513   --   73   --   --   98    07/17/19 1508   --   67   --   --   99    07/17/19 1506   --   79   --   --   100    07/17/19 1501   --   73   --   109/60   100    07/17/19 1456   --   79   --   --   99    07/17/19 1451   --   74   --   --   99    07/17/19 1446   --   73   --   --   99    07/17/19 1441   --   83   --   --   100    07/17/19 1436   --   80   --   --   99    07/17/19 1431   --   73   --   108/50   98    07/17/19 1416   --   74   --   --   99    07/17/19 1411   --   77   --   --   100    07/17/19 1406   --   78   --   --   99    07/17/19 1401   --   74   --   108/49   100    07/17/19 1356   --   74   --   --   99    07/17/19 1355   --   --   17   --   --    07/17/19 1351   --   76   --   --   100    07/17/19 1346   --   80   --   --   100    07/17/19 1341   --   76   --   --   100 07/17/19 1336   --   82   --   --   99    07/17/19 1331   --   74   --   111/52   --    07/17/19 1328   --   82   --   --   100    07/17/19 1301   --   79   --   109/52   --    07/17/19 1258   --   84   --   --   100 07/17/19 1253   --   79   --   --   100 07/17/19 1233   --   86   --   --   100    07/17/19 1231   --   82   17   113/54   --    07/17/19 1228   --   85   --   --    100 07/17/19 1223   --   74   --   --   100 07/17/19 1218   --   80   --   --   100 07/17/19 1213   --   93   --   --   100 07/17/19 1208   --   80   --   --   100 07/17/19 1203   --   85   --   --   100 07/17/19 1201   --   85   --   110/55   --    07/17/19 1158   --   81   --   --   100 07/17/19 1153   --   80   --   --   100 07/17/19 1148   --   80   --   --   --    07/17/19 1144   --   80   --   117/60   --    07/17/19 1138   --   85   --   --   100 07/17/19 1133   --   85   --   --   100 07/17/19 1131   --   82   --   112/53   100 07/17/19 1100   --   74   --   96/45   --    07/17/19 1052   --   76   --   --   --    07/17/19 1047   --   73   --   --   --    07/17/19 1046   --   70   --   104/45   --    07/17/19 1042   --   73   --   --   --    07/17/19 1037   --   75   --   --   --    07/17/19 1032   --   72   --   --   --    07/17/19 1031   --   76   --   103/46   --    07/17/19 1027   --   73   --   --   100 07/17/19 1026   97.6 (36.4)   74   16   99/52   100 07/17/19 1025   --   --   --   --   100 07/17/19 1022   --   85   --   --   --    07/17/19 1014   --   73   --   --   --    07/17/19 1009   --   71   --   --   100 07/17/19 1004   --   73   --   --   100 07/17/19 1000   --   80   --   --   --    07/17/19 0954   --   84   --   --   --    07/17/19 0949   --   90   --   --   100 07/17/19 0944   --   89   --   --   100 07/17/19 0939   --   90   --   --   100    07/17/19 0937   --   80   --   117/61   --    07/17/19 0830   --   --   --   --   --    Comment rows:    OBSERV: Pt taken to 10 via w/c for U/S at 07/17/19 0830 07/17/19 0820   --   75   --   --   100    07/17/19 0815   --   75   --   --   100    07/17/19 0742   --   82   17   114/58   100    07/17/19 0737   --   78   --   --   100    07/17/19 0728   --   86   --   --   100    07/17/19 0723   --   77   --   --   99    07/17/19 0718   --   74   --   --   99    07/17/19 0713   --   78   --   --    99    07/17/19 0708   --   77   --   --   100    07/17/19 0703   --   70   --   --   99    07/17/19 0701   --   70   --   --   --    07/17/19 0700   --   69   --   97/45   100    07/17/19 0653   --   69   --   --   99    07/17/19 0640   --   --   18   --   --    07/17/19 0605   --   71   --   101/45   100    07/17/19 0600   97.7 (36.5)   --   --   --   --    07/17/19 0443   --   --   --   --   --    Comment rows:    OBSERV: pt up to bathroom at 07/17/19 0443 07/17/19 0400   --   74   --   97/44   99    07/17/19 0310   --   73   --   104/48   97    07/17/19 0300   --   79   --   102/51   --    07/17/19 0035   --   --   17   --   --    07/17/19 0010   --   80   --   --   99    07/17/19 0009   --   --   --   99/38  (Abnormal)    --    07/17/19 0000   --   73   --   100/40   99    07/16/19 2300   --   --   --   121/59   --    07/16/19 2232   --   75   --   --   100    07/16/19 2227   --   --   18   --   --    07/16/19 2200   --   78   --   116/56   100    07/16/19 2121   --   75   --   --   100    07/16/19 2118   --   70   --   119/58   --    07/16/19 2100   --   74   --   --   100    07/16/19 2034   --   --   --   125/64   --    07/16/19 2015   98 (36.7)   --   --   --   --    07/16/19 2000   --   86   --   117/54   100    07/16/19 1946   --   81   --   120/59   --    07/16/19 1942   --   76   --   --   99    07/16/19 1931   --   71   --   117/55   --    07/16/19 1924   --   --   16   --   --    07/16/19 1916   --   84   --   127/65   --    07/16/19 1900   --   80   --   --   99    07/16/19 1600   98.2 (36.8)   88   17   112/57   --            FHR (last 3 days)     Date/Time Fetal HR Assessment Method Fetal HR (beats/min) Fetal Heart Baseline Rate Fetal HR Variability Fetal HR Accelerations Fetal HR Decelerations Fetal HR Tracing Category    07/18/19 1909  external  135  normal range  moderate (amplitude range 6 to 25 bpm)  greater than/equal to 15 bpm;lasting at least 15 seconds  absent  --    07/18/19 1900   external  135 Strip began with , but after accel changed to   normal range  moderate (amplitude range 6 to 25 bpm)  greater than/equal to 15 bpm;lasting at least 15 seconds  absent  --    07/18/19 1830  external  140  normal range  moderate (amplitude range 6 to 25 bpm)  greater than/equal to 15 bpm;lasting at least 15 seconds  absent  --    07/18/19 1823  external  --  --  --  --  --  --    07/18/19 1120  external  125  normal range  moderate (amplitude range 6 to 25 bpm)  greater than/equal to 15 bpm;lasting at least 15 seconds  absent  --    07/18/19 1100  external  125  normal range  moderate (amplitude range 6 to 25 bpm)  greater than/equal to 15 bpm;lasting at least 15 seconds  absent  --    07/18/19 1028  external  --  --  --  --  --  --    07/17/19 2230  external  130  normal range  moderate (amplitude range 6 to 25 bpm)  lasting at least 15 seconds;greater than/equal to 15 bpm  absent  --    07/17/19 1527  external  125  normal range  moderate (amplitude range 6 to 25 bpm)  absent  absent  --    07/17/19 1500  external  125  normal range  moderate (amplitude range 6 to 25 bpm)  absent  absent  --    07/17/19 1430  external  120  normal range  moderate (amplitude range 6 to 25 bpm)  greater than/equal to 15 bpm;lasting at least 15 seconds  absent  --    07/17/19 1400  external  115  normal range  moderate (amplitude range 6 to 25 bpm)  absent  absent  --    07/17/19 1330  external  115  normal range  moderate (amplitude range 6 to 25 bpm)  greater than/equal to 15 bpm;lasting at least 15 seconds  absent  --    07/17/19 1300  external  120  normal range  moderate (amplitude range 6 to 25 bpm)  absent  absent  --    07/17/19 1230  external  120  normal range  moderate (amplitude range 6 to 25 bpm)  greater than/equal to 15 bpm;lasting at least 15 seconds  absent  --    07/17/19 1200  external  115  normal range  moderate (amplitude range 6 to 25 bpm)  greater than/equal to 15 bpm;lasting at least  15 seconds  absent  --    07/17/19 1130  external  115  normal range  moderate (amplitude range 6 to 25 bpm)  greater than/equal to 15 bpm;lasting at least 15 seconds  absent  --    07/17/19 1100  external  115  normal range  moderate (amplitude range 6 to 25 bpm)  greater than/equal to 15 bpm;lasting at least 15 seconds  absent  --    07/17/19 1030  external  115  normal range  moderate (amplitude range 6 to 25 bpm)  greater than/equal to 15 bpm;lasting at least 15 seconds  absent  --    07/17/19 1000  external  115  normal range  moderate (amplitude range 6 to 25 bpm)  greater than/equal to 15 bpm;lasting at least 15 seconds  absent  --    07/17/19 0830  external  120  normal range  moderate (amplitude range 6 to 25 bpm)  greater than/equal to 15 bpm;lasting at least 15 seconds  absent  --    07/17/19 0820  external  120  normal range  moderate (amplitude range 6 to 25 bpm)  greater than/equal to 15 bpm;lasting at least 15 seconds  absent  --    07/17/19 0800  external  120  normal range  moderate (amplitude range 6 to 25 bpm)  greater than/equal to 15 bpm;lasting at least 15 seconds  absent  --    07/17/19 0730  external  120  normal range  moderate (amplitude range 6 to 25 bpm)  lasting at least 15 seconds;greater than/equal to 15 bpm  absent  --    07/17/19 0700  external  115  normal range  moderate (amplitude range 6 to 25 bpm)  absent  absent  --    07/17/19 0630  external  115  normal range  moderate (amplitude range 6 to 25 bpm)  greater than/equal to 15 bpm;lasting at least 15 seconds  absent  --    07/17/19 0600  external  120  normal range  moderate (amplitude range 6 to 25 bpm)  greater than/equal to 15 bpm;lasting at least 15 seconds  absent  --    07/17/19 0530  external  115  normal range  moderate (amplitude range 6 to 25 bpm)  greater than/equal to 15 bpm;lasting at least 15 seconds  absent  --    07/17/19 0500  external  115  normal range  moderate (amplitude range 6 to 25 bpm)  greater  than/equal to 15 bpm;lasting at least 15 seconds  absent  --    07/17/19 0430  external  115  normal range  moderate (amplitude range 6 to 25 bpm)  greater than/equal to 15 bpm;lasting at least 15 seconds  absent  --    07/17/19 0400  external  115  normal range  moderate (amplitude range 6 to 25 bpm)  absent  absent  --    07/17/19 0330  external  115  normal range  moderate (amplitude range 6 to 25 bpm)  greater than/equal to 15 bpm;lasting at least 15 seconds  absent  --    07/17/19 0300  external  115  normal range  moderate (amplitude range 6 to 25 bpm)  greater than/equal to 15 bpm;lasting at least 15 seconds  absent  --    07/17/19 0230  external  115  normal range  moderate (amplitude range 6 to 25 bpm)  greater than/equal to 15 bpm;lasting at least 15 seconds  absent  --    07/17/19 0200  external  125  normal range  moderate (amplitude range 6 to 25 bpm)  greater than/equal to 15 bpm;lasting at least 15 seconds  absent  --    07/17/19 0130  external  115  normal range  moderate (amplitude range 6 to 25 bpm)  greater than/equal to 15 bpm;lasting at least 15 seconds  absent  --    07/17/19 0100  external  115  normal range  moderate (amplitude range 6 to 25 bpm)  greater than/equal to 15 bpm;lasting at least 15 seconds  absent  --    07/17/19 0030  external  115  normal range  moderate (amplitude range 6 to 25 bpm)  greater than/equal to 15 bpm;lasting at least 15 seconds  absent  --    07/17/19 0000  external  115  normal range  moderate (amplitude range 6 to 25 bpm)  lasting at least 15 seconds;greater than/equal to 15 bpm  absent  --    07/16/19 2330  external  120  normal range  moderate (amplitude range 6 to 25 bpm)  greater than/equal to 15 bpm;lasting at least 15 seconds  absent  --    07/16/19 2300  external  125  normal range  moderate (amplitude range 6 to 25 bpm)  greater than/equal to 15 bpm;lasting at least 15 seconds  absent  --    07/16/19 2230  external  125  normal range  moderate (amplitude  range 6 to 25 bpm)  greater than/equal to 15 bpm;lasting at least 15 seconds  absent  --    07/16/19 2200  external  125  normal range  moderate (amplitude range 6 to 25 bpm)  greater than/equal to 15 bpm;lasting at least 15 seconds  absent  --    07/16/19 2130  external  130  normal range  moderate (amplitude range 6 to 25 bpm)  greater than/equal to 15 bpm;lasting at least 15 seconds  absent  --    07/16/19 2100  external  130  normal range  moderate (amplitude range 6 to 25 bpm)  greater than/equal to 15 bpm;lasting at least 15 seconds  absent  --    07/16/19 2030  external  130  normal range  moderate (amplitude range 6 to 25 bpm)  greater than/equal to 15 bpm;lasting at least 15 seconds  absent  --    07/16/19 2000  external  130  normal range  moderate (amplitude range 6 to 25 bpm)  greater than/equal to 15 bpm;lasting at least 15 seconds  absent  --    07/16/19 1930  external  130  normal range  moderate (amplitude range 6 to 25 bpm)  greater than/equal to 15 bpm;lasting at least 15 seconds  absent  --    07/16/19 1900  external  135  normal range  moderate (amplitude range 6 to 25 bpm)  greater than/equal to 10 bpm (32 wks gest or less);lasts at least 10 seconds (32 wks gest or less)  absent  --    07/16/19 1830  external  135  normal range  moderate (amplitude range 6 to 25 bpm)  greater than/equal to 10 bpm (32 wks gest or less);lasts at least 10 seconds (32 wks gest or less)  variable  --    07/16/19 1800  external  135  normal range  moderate (amplitude range 6 to 25 bpm)  greater than/equal to 10 bpm (32 wks gest or less);lasts at least 10 seconds (32 wks gest or less)  absent  --    07/16/19 1730  external  130  normal range  moderate (amplitude range 6 to 25 bpm)  greater than/equal to 10 bpm (32 wks gest or less);lasts at least 10 seconds (32 wks gest or less)  absent  --    07/16/19 1700  external  135  normal range  moderate (amplitude range 6 to 25 bpm)  lasts at least 10 seconds (32 wks gest or  less);greater than/equal to 10 bpm (32 wks gest or less)  absent  --    07/16/19 1630  external  130  normal range  moderate (amplitude range 6 to 25 bpm)  greater than/equal to 15 bpm;lasting at least 15 seconds  variable  --    07/16/19 1600  external  135  normal range  moderate (amplitude range 6 to 25 bpm)  greater than/equal to 15 bpm;lasting at least 15 seconds  absent  --        Uterine Activity (last 3 days)     Date/Time Method Contraction Frequency (Minutes) Contraction Duration (sec) Contraction Intensity Uterine Resting Tone Contraction Pattern    07/18/19 1909  external tocotransducer;per patient report;palpation  --  80  mild by palpation  soft by palpation  Irregular    07/18/19 1900  external tocotransducer;per patient report;palpation  --  60-70  mild by palpation Pt. not feeling any UCs  soft by palpation  Irritability    07/18/19 1830  external tocotransducer;per patient report;palpation  --  30  mild by palpation Pt. not feeling any UCs  soft by palpation  Irritability    07/18/19 1823  external tocotransducer;per patient report;palpation  --  --  --  soft by palpation  --    07/18/19 1120  external tocotransducer;per patient report;palpation  3-6  40-80  mild by palpation Pt. does not feel UCs  soft by palpation  Irregular    07/18/19 1100  external tocotransducer;per patient report  3-5  40-60  mild by palpation Pt. does not feel UCs  soft by palpation  Irregular    07/18/19 1028  external tocotransducer;per patient report;palpation  --  --  --  soft by palpation  --    07/17/19 2230  per patient report;palpation;external tocotransducer  0  --  no contractions  soft by palpation  Irritability    07/17/19 1527  palpation;external tocotransducer  0  --  no contractions  soft by palpation  --    07/17/19 1500  palpation;external tocotransducer  --  --  no contractions  soft by palpation  --    07/17/19 1430  external tocotransducer  --  --  no contractions  soft by palpation  Irritability     07/17/19 1400  external tocotransducer  --  --  mild by palpation  soft by palpation  Irregular    07/17/19 1330  external tocotransducer  x1  --  mild by palpation  soft by palpation  Irregular    07/17/19 1300  external tocotransducer  --  --  no contractions  soft by palpation  Irritability    07/17/19 1230  external tocotransducer  x2  --  mild by palpation  soft by palpation  Irregular    07/17/19 1200  external tocotransducer  x1  --  mild by palpation  soft by palpation  Irregular    07/17/19 1130  external tocotransducer  x2  --  mild by palpation  soft by palpation  Irregular    07/17/19 1100  external tocotransducer  --  --  no contractions  soft by palpation  Irregular    07/17/19 1030  external tocotransducer  x2  --  mild by palpation  soft by palpation  Irregular    07/17/19 1000  external tocotransducer  x2  --  mild by palpation  soft by palpation  Irritability    07/17/19 0830  palpation;external tocotransducer  --  --  no contractions  soft by palpation  --    07/17/19 0820  external tocotransducer  --  --  no contractions  soft by palpation  Irritability    07/17/19 0800  external tocotransducer  --  --  no contractions  soft by palpation  Irritability    07/17/19 0730  external tocotransducer  --  --  no contractions  soft by palpation  Irritability    07/17/19 0700  external tocotransducer;per patient report  --  --  no contractions  soft by palpation  --    07/17/19 0630  external tocotransducer  --  --  no contractions  soft by palpation  --    07/17/19 0600  external tocotransducer  --  --  no contractions  soft by palpation  --    07/17/19 0530  external tocotransducer;per patient report;palpation  --  --  no contractions  soft by palpation  --    07/17/19 0500  external tocotransducer  --  --  no contractions  soft by palpation  --    07/17/19 0430  external tocotransducer;per patient report;palpation  --  --  no contractions  soft by palpation  --    07/17/19 0400  external  tocotransducer;per patient report;palpation  occasional  --  mild by palpation  soft by palpation  --    07/17/19 0330  external tocotransducer;per patient report;palpation  occasional  --  mild by palpation  soft by palpation  --    07/17/19 0300  external tocotransducer;per patient report;palpation  occasional  --  mild by palpation  soft by palpation  --    07/17/19 0230  external tocotransducer;per patient report;palpation  occasional  --  mild by palpation  soft by palpation  --    07/17/19 0200  external tocotransducer;per patient report;palpation  occasional  --  mild by palpation  soft by palpation  --    07/17/19 0130  external tocotransducer;per patient report;palpation  occasional  --  mild by palpation  soft by palpation  --    07/17/19 0100  external tocotransducer;per patient report  occasional  --  mild by palpation  soft by palpation  --    07/17/19 0030  external tocotransducer;palpation;per patient report  occasional  --  mild by palpation  soft by palpation  --    07/17/19 0000  external tocotransducer;palpation;per patient report  --  --  no contractions no ctx palpated or felt by pt  soft by palpation  --    07/16/19 2330  external tocotransducer  6-8  70-90  mild by palpation  soft by palpation  --    07/16/19 2300  external tocotransducer  3-5.5    mild by palpation  soft by palpation  --    07/16/19 2230  external tocotransducer  3-5.5  70-90  mild by palpation  soft by palpation  --    07/16/19 2200  external tocotransducer  6-8    mild by palpation  soft by palpation  --    07/16/19 2130  external tocotransducer  5-7  60-80  mild by palpation  soft by palpation  --    07/16/19 2100  external tocotransducer  3.5-6  70-90  mild by palpation  soft by palpation  --    07/16/19 2030  external tocotransducer  5-7  60-80  mild by palpation  soft by palpation  --    07/16/19 2000  external tocotransducer;palpation;per patient report  3.5-6  60-90  mild by palpation  soft by palpation   --    07/16/19 1930  external tocotransducer  4-6  70-90  mild by palpation  soft by palpation  --    07/16/19 1900  external tocotransducer  2-4  60-90  mild by palpation  soft by palpation  --    07/16/19 1830  external tocotransducer  2-7  70-90  mild by palpation  soft by palpation  --    07/16/19 1800  external tocotransducer  2-10    mild by palpation  soft by palpation  --    07/16/19 1730  external tocotransducer  2-8    mild by palpation  soft by palpation  Irregular    07/16/19 1700  external tocotransducer;palpation  2-6    mild by palpation  soft by palpation  Irregular    07/16/19 1630  palpation;external tocotransducer  1-4    mild by palpation  soft by palpation  Regular    07/16/19 1600  palpation;external tocotransducer  x2  70-90  mild by palpation  soft by palpation  Irregular        Labor Pain (last 3 days)     Date/Time Pain Rating (0-10): Rest Pain Rating (0-10): Activity Pain Management Interventions    07/19/19 0950  0  0  --    07/18/19 1020  0  0  --    07/17/19 1618  0  0  --    07/17/19 1254  0  --  --    07/17/19 0730  0  0  --    07/17/19 0500  1  1  --    07/16/19 1924  5  5  --    07/16/19 1600  6  6  --

## 2019-07-19 NOTE — NON STRESS TEST
Nannette Godinez, a  at 31w2d with an ANDREA of 2019, by Ultrasound, was seen at [facility] for a nonstress test.    Chief Complaint   Patient presents with   • Abdominal Cramping     Pt reports cramping that started around 1000 this morning that comes and goes frequently. Pt denies VB and LOF. Pt reports less FM that she is used to since this morning.       Patient Active Problem List   Diagnosis   • Primigravida   • Pregnancy   •  labor       Start Time: 955  Stop Time: 1015    RNST; Dr. Vera reviewed strip and stated he was comfortable with it - Orin Rhodes RN

## 2019-07-19 NOTE — PROGRESS NOTES
Hospital Day # 4    Patient name: Nannette Godinez  Age: 19 y.o.  Sex: female  YOB: 2000   MRN: [id number]  Admission Date: 2019    Gestational age: 31w2d    Diagnosis:    labor    Pregnancy      Subjective:    19 y.o.  @ 31w2d - admitted on Tuesday evening with regular contractions, threatened  labor. Received betamethasone for FLM and Magnesium for both tocolysis and neuro protection.    Overnight, she has no complaints.   No cramping  No leaking   No bleeding   Good fetal movement.     Objective:      Temp:  [97.9 °F (36.6 °C)-98.2 °F (36.8 °C)] 98.2 °F (36.8 °C)  Heart Rate:  [73-85] 85  Resp:  [18] 18  BP: (116-120)/(61-64) 120/61    Exam:     Gen : WN, WD female in NAD     Abdomen: Soft, non-tender and gravid to consistent with early third trimester     Extremities: No evidence of DVT, Edema 1+     Reflexes: 2+ bilaterally, no clonus     Ultrasound:      Cervix 1.5 cm   AGA - 52% (3#13oz)   Normal LESVIA at 19 cm   Cephalic and No previa   BPP      Labs:  Lab Results   Component Value Date    WBC 13.14 (H) 2019    HGB 10.8 (L) 2019    HCT 32.2 (L) 2019    MCV 95.3 2019     2019     Urinalysis negative   FFN - Positive   UDS - not in system (? Done)   GBS negative   ABS - residual rhogam     Assessment:      1) Pregnancy at 31w2d  2) Threatened  labor with significant risk factors of short cervix and +FFN -- However MFM has seen (see note with ultrasound) and she is potentially candidate for outpatient management.   Still has episodes of uterine irritability, but does not require medication or intervention, so should be fine to do as outpatient.       Plan:       Discharge home  [facility] BID  PTL warnings   Pelvic rest   Limit overall activity   Keep Appointment with Dr. Peterson next week.      Fortunato Vera MD  2019  11:31 AM

## 2019-07-20 NOTE — PAYOR COMM NOTE
"[facility]   &  [facility] [place]  [address]    [address] [place] [address]    [place] [address]    Rosalinda Gold  Utilization Review/Room Reservations  Phone: [phone], [phone] or [phone]  Fax: [phone]  Email: [email or web address]  Please call, fax back, or email with authorization or any questions! Thanks!        D/C SUMMARY  ID#[id number]        This fax contains any of the following:  Face Sheet, H&P, progress notes, consults, orders, meds, lab results, labor record, vitals, delivery worksheet, op note, d/c summary.        Radha Avalos (19 y.o. Female)     Date of Birth Social Security Number Address Home Phone MRN    2000  [address] [place], [place] [address] [phone] [id number]    Congregation Marital Status          Non-Mosque Single       Admission Date Admission Type Admitting Provider Attending Provider Department, Room/Bed    19 Elective Hernesto Sandoval MD  [facility] ANTEPARTUM UNIT, 3305/1    Discharge Date Discharge Disposition Discharge Destination        2019 Home or Self Care              Attending Provider:  (none)   Allergies:  No Known Allergies    Isolation:  None   Infection:  None   Code Status:  Prior    Ht:  170.2 cm (67\")   Wt:  61.7 kg (136 lb)    Admission Cmt:  None   Principal Problem:   labor [O60.00]                 Active Insurance as of 2019     Primary Coverage     Payor Plan Insurance Group Employer/Plan Group    ANTHEM BLUE CROSS ANTHEM BLUE CROSS BLUE SHIELD PPO [id number]     Payor Plan Address Payor Plan Phone Number Payor Plan Fax Number Effective Dates    [address] [phone]  2019 - None Entered    [facility] [id number]       Subscriber Name Subscriber Birth Date Member ID       RADHA AVALOS 2000 [id number]           Secondary Coverage     Payor Plan Insurance Group Employer/Plan Group    [employer] " PLAN PASSPORT      Payor Plan Address Payor Plan Phone Number Payor Plan Fax Number Effective Dates    [address] [phone]  10/1/2015 - None Entered    [facility] [id number]       Subscriber Name Subscriber Birth Date Member ID       RADHA AVALOS 2000 [id number]                 Emergency Contacts      (Rel.) Home Phone Work Phone Mobile Phone    PETAR AVALOS (Father) -- -- [phone]               Discharge Summary      Fortunato Vera MD at 2019 11:41 AM            Date of Discharge:  2019    Discharge Diagnosis: 1)  contractions                                          2) Short cervix and +FFN     Presenting Problem/History of Present Illness  Pregnancy [Z34.90]   labor [O60.00]       Hospital Course  Patient is a 19 y.o. female  31w2d presented with abdominal pains. Found to have +FFN and short cervix. Given BMZ, magnesium. Improved and now wishes to trial outpatient management. Will discharge on pelvic rest, limited activity and reviewed dynamed and found suggestion to use 200 mg of progesterone vaginal suppository until 34 weeks.  Discharge and instructions were reviewed and she voiced understanding.     Procedures Performed         Consults:   Consults     Date and Time Order Name Status Description    2019 1830 Inpatient Neonatology Consult            Pertinent Test Results: +FFN, cervical length < 2 cm     Condition on Discharge:  Stable     Discharge Disposition  Home or Self Care    Discharge Medications     Discharge Medications      New Medications      Instructions Start Date   progesterone 200 MG capsule  Commonly known as:  PROMETRIUM   200 mg, Vaginal, Daily         Continue These Medications      Instructions Start Date   ferrous sulfate 325 (65 FE) MG EC tablet   325 mg, Oral, 2 Times Daily With Meals      PNV PRENATAL PLUS MULTIVIT+DHA 27-1 & 312 MG misc   TAKE 1 TAB & 1 CAPSULE BY MOUTH ONE TIME A DAY         Stop These Medications     amoxicillin 500 MG capsule  Commonly known as:  AMOXIL            Discharge Diet:   Diet Instructions     Advance Diet As Tolerated            Activity at Discharge:   Activity Instructions     Other Instructions (Specify)      Limit overall activity    Pelvic Rest            Follow-up Appointments  Future Appointments   Date Time Provider Department Center   7/22/2019 10:30 AM Cezar Peterson MD [facility] None     Additional Instructions for the Follow-ups that You Need to Schedule     Discharge Follow-up with Specialty: Dr. Peterson; 1 Week   As directed      Specialty:  Dr. Peterson    Follow Up:  1 Week               Test Results Pending at Discharge   Order Current Status    Group B Streptococcus Culture - Swab, Vaginal/Rectum Preliminary result           Fortunato Vera MD  07/19/19  11:41 AM              Electronically signed by Fortunato Vera MD at 7/19/2019 11:43 AM

## 2019-07-22 ENCOUNTER — ROUTINE PRENATAL (OUTPATIENT)
Dept: OBSTETRICS AND GYNECOLOGY | Facility: CLINIC | Age: 19
End: 2019-07-22

## 2019-07-22 ENCOUNTER — HOSPITAL ENCOUNTER (OUTPATIENT)
Facility: HOSPITAL | Age: 19
Setting detail: OBSERVATION
Discharge: HOME | End: 2019-07-22
Attending: OBSTETRICS & GYNECOLOGY | Admitting: OBSTETRICS & GYNECOLOGY

## 2019-07-22 VITALS — SYSTOLIC BLOOD PRESSURE: 100 MMHG | BODY MASS INDEX: 20.67 KG/M2 | DIASTOLIC BLOOD PRESSURE: 60 MMHG | WEIGHT: 132 LBS

## 2019-07-22 VITALS — TEMPERATURE: 98.1 F | BODY MASS INDEX: 21.3 KG/M2 | HEIGHT: 67 IN

## 2019-07-22 DIAGNOSIS — R63.5 ABNORMAL WEIGHT GAIN: ICD-10-CM

## 2019-07-22 DIAGNOSIS — Z34.03 ENCOUNTER FOR SUPERVISION OF NORMAL FIRST PREGNANCY IN THIRD TRIMESTER: Primary | ICD-10-CM

## 2019-07-22 PROCEDURE — 59025 FETAL NON-STRESS TEST: CPT

## 2019-07-22 PROCEDURE — 96372 THER/PROPH/DIAG INJ SC/IM: CPT

## 2019-07-22 PROCEDURE — 59025 FETAL NON-STRESS TEST: CPT | Performed by: OBSTETRICS & GYNECOLOGY

## 2019-07-22 PROCEDURE — 25010000002 TERBUTALINE PER 1 MG: Performed by: OBSTETRICS & GYNECOLOGY

## 2019-07-22 PROCEDURE — 96360 HYDRATION IV INFUSION INIT: CPT

## 2019-07-22 PROCEDURE — G0378 HOSPITAL OBSERVATION PER HR: HCPCS

## 2019-07-22 PROCEDURE — 0502F SUBSEQUENT PRENATAL CARE: CPT | Performed by: OBSTETRICS & GYNECOLOGY

## 2019-07-22 RX ORDER — TERBUTALINE SULFATE 1 MG/ML
0.25 INJECTION, SOLUTION SUBCUTANEOUS ONCE
Status: COMPLETED | OUTPATIENT
Start: 2019-07-22 | End: 2019-07-22

## 2019-07-22 RX ADMIN — SODIUM CHLORIDE, POTASSIUM CHLORIDE, SODIUM LACTATE AND CALCIUM CHLORIDE 1000 ML: 600; 310; 30; 20 INJECTION, SOLUTION INTRAVENOUS at 04:05

## 2019-07-22 RX ADMIN — TERBUTALINE SULFATE 0.25 MG: 1 INJECTION SUBCUTANEOUS at 03:51

## 2019-07-22 NOTE — PROGRESS NOTES
CC follow-up prenatal visit.  Just discharged from the hospital with  labor and positive fetal fibronectin test.  Will get thyroid test today due to poor weight gain.  Good fetal movement.  Patient on 200 mg progesterone vaginally nightly.  Return office 1 week.

## 2019-07-22 NOTE — NON STRESS TEST
Nannette Godinez, a  at 31w5d with an ANDREA of 2019, by Ultrasound, was seen at [facility] for a nonstress test.    Chief Complaint   Patient presents with   • Contractions     contractions started to become more intense around 0100 tonight. PT. admitted last week for PTL and discharged home. Pt. continued to have some contractions daily but these are worse. No LOF, no VB, positive FM       Patient Active Problem List   Diagnosis   • Primigravida   • Pregnancy   •  labor       Start Time: 320  Stop Time: 522    Interpretation A  Nonstress Test Interpretation A: Reactive (19 0521 : Jackeline Garza, RN)        Pt. Responded to the 1dose of terbuatline and LR infusion bolus. Contractions stopped after dose. Reactive NST for gestational age of 31+5.

## 2019-07-23 ENCOUNTER — TELEPHONE (OUTPATIENT)
Dept: OBSTETRICS AND GYNECOLOGY | Facility: CLINIC | Age: 19
End: 2019-07-23

## 2019-07-23 LAB
FT4I SERPL CALC-MCNC: 2.2 (ref 1.2–4.9)
T3RU NFR SERPL: 15 % (ref 24–39)
T4 SERPL-MCNC: 14.9 UG/DL (ref 4.5–12)
TSH SERPL DL<=0.005 MIU/L-ACNC: 2.16 UIU/ML (ref 0.45–4.5)

## 2019-07-23 NOTE — TELEPHONE ENCOUNTER
----- Message from Cezar Peterson MD sent at 7/23/2019  7:34 AM EDT -----  Please tell pt her thyroid tests are normal for pregnancy. Thanks PRINCE

## 2019-07-26 ENCOUNTER — HOSPITAL ENCOUNTER (OUTPATIENT)
Facility: HOSPITAL | Age: 19
Setting detail: OBSERVATION
Discharge: HOME | End: 2019-07-26
Attending: OBSTETRICS & GYNECOLOGY | Admitting: OBSTETRICS & GYNECOLOGY

## 2019-07-26 ENCOUNTER — HOSPITAL ENCOUNTER (INPATIENT)
Facility: HOSPITAL | Age: 19
LOS: 3 days | Discharge: HOME | End: 2019-07-29
Attending: OBSTETRICS & GYNECOLOGY | Admitting: OBSTETRICS & GYNECOLOGY

## 2019-07-26 VITALS
RESPIRATION RATE: 18 BRPM | HEART RATE: 96 BPM | TEMPERATURE: 97.7 F | HEIGHT: 67 IN | WEIGHT: 134.4 LBS | BODY MASS INDEX: 21.09 KG/M2 | DIASTOLIC BLOOD PRESSURE: 61 MMHG | SYSTOLIC BLOOD PRESSURE: 115 MMHG

## 2019-07-26 PROBLEM — O42.919 PRETERM PREMATURE RUPTURE OF MEMBRANES (PPROM) WITH UNKNOWN ONSET OF LABOR: Status: ACTIVE | Noted: 2019-07-26

## 2019-07-26 LAB
ABO GROUP BLD: NORMAL
BASOPHILS # BLD AUTO: 0.04 10*3/MM3 (ref 0–0.2)
BASOPHILS NFR BLD AUTO: 0.3 % (ref 0–1.5)
BLD GP AB SCN SERPL QL: POSITIVE
DEPRECATED RDW RBC AUTO: 41.7 FL (ref 37–54)
EOSINOPHIL # BLD AUTO: 0.09 10*3/MM3 (ref 0–0.4)
EOSINOPHIL NFR BLD AUTO: 0.6 % (ref 0.3–6.2)
ERYTHROCYTE [DISTWIDTH] IN BLOOD BY AUTOMATED COUNT: 12 % (ref 12.3–15.4)
HCT VFR BLD AUTO: 30.8 % (ref 34–46.6)
HGB BLD-MCNC: 10.4 G/DL (ref 12–15.9)
IMM GRANULOCYTES # BLD AUTO: 0.5 10*3/MM3 (ref 0–0.05)
IMM GRANULOCYTES NFR BLD AUTO: 3.6 % (ref 0–0.5)
LYMPHOCYTES # BLD AUTO: 1.97 10*3/MM3 (ref 0.7–3.1)
LYMPHOCYTES NFR BLD AUTO: 14.2 % (ref 19.6–45.3)
MCH RBC QN AUTO: 32 PG (ref 26.6–33)
MCHC RBC AUTO-ENTMCNC: 33.8 G/DL (ref 31.5–35.7)
MCV RBC AUTO: 94.8 FL (ref 79–97)
MONOCYTES # BLD AUTO: 0.71 10*3/MM3 (ref 0.1–0.9)
MONOCYTES NFR BLD AUTO: 5.1 % (ref 5–12)
NEUTROPHILS # BLD AUTO: 10.57 10*3/MM3 (ref 1.7–7)
NEUTROPHILS NFR BLD AUTO: 76.2 % (ref 42.7–76)
NRBC BLD AUTO-RTO: 0 /100 WBC (ref 0–0.2)
PLATELET # BLD AUTO: 164 10*3/MM3 (ref 140–450)
PMV BLD AUTO: 10.9 FL (ref 6–12)
RBC # BLD AUTO: 3.25 10*6/MM3 (ref 3.77–5.28)
RESIDUAL RHIG DETECTED: NORMAL
RH BLD: NEGATIVE
T&S EXPIRATION DATE: NORMAL
WBC NRBC COR # BLD: 13.88 10*3/MM3 (ref 3.4–10.8)

## 2019-07-26 PROCEDURE — 85025 COMPLETE CBC W/AUTO DIFF WBC: CPT | Performed by: OBSTETRICS & GYNECOLOGY

## 2019-07-26 PROCEDURE — 86850 RBC ANTIBODY SCREEN: CPT | Performed by: OBSTETRICS & GYNECOLOGY

## 2019-07-26 PROCEDURE — 86900 BLOOD TYPING SEROLOGIC ABO: CPT | Performed by: OBSTETRICS & GYNECOLOGY

## 2019-07-26 PROCEDURE — 96372 THER/PROPH/DIAG INJ SC/IM: CPT

## 2019-07-26 PROCEDURE — 25010000002 TERBUTALINE PER 1 MG: Performed by: OBSTETRICS & GYNECOLOGY

## 2019-07-26 PROCEDURE — 86901 BLOOD TYPING SEROLOGIC RH(D): CPT | Performed by: OBSTETRICS & GYNECOLOGY

## 2019-07-26 PROCEDURE — S0260 H&P FOR SURGERY: HCPCS | Performed by: OBSTETRICS & GYNECOLOGY

## 2019-07-26 PROCEDURE — 86870 RBC ANTIBODY IDENTIFICATION: CPT | Performed by: OBSTETRICS & GYNECOLOGY

## 2019-07-26 PROCEDURE — 59025 FETAL NON-STRESS TEST: CPT

## 2019-07-26 PROCEDURE — G0378 HOSPITAL OBSERVATION PER HR: HCPCS

## 2019-07-26 PROCEDURE — 25010000002 BUTORPHANOL PER 1 MG: Performed by: OBSTETRICS & GYNECOLOGY

## 2019-07-26 PROCEDURE — 59025 FETAL NON-STRESS TEST: CPT | Performed by: OBSTETRICS & GYNECOLOGY

## 2019-07-26 PROCEDURE — 25010000002 AMPICILLIN PER 500 MG: Performed by: OBSTETRICS & GYNECOLOGY

## 2019-07-26 RX ORDER — SODIUM CHLORIDE, SODIUM LACTATE, POTASSIUM CHLORIDE, CALCIUM CHLORIDE 600; 310; 30; 20 MG/100ML; MG/100ML; MG/100ML; MG/100ML
125 INJECTION, SOLUTION INTRAVENOUS CONTINUOUS
Status: DISCONTINUED | OUTPATIENT
Start: 2019-07-26 | End: 2019-07-27

## 2019-07-26 RX ORDER — MAGNESIUM SULFATE HEPTAHYDRATE 40 MG/ML
2 INJECTION, SOLUTION INTRAVENOUS CONTINUOUS
Status: DISCONTINUED | OUTPATIENT
Start: 2019-07-26 | End: 2019-07-27

## 2019-07-26 RX ORDER — TERBUTALINE SULFATE 1 MG/ML
0.25 INJECTION, SOLUTION SUBCUTANEOUS AS NEEDED
Status: COMPLETED | OUTPATIENT
Start: 2019-07-26 | End: 2019-07-26

## 2019-07-26 RX ADMIN — AMPICILLIN SODIUM 2 G: 2 INJECTION, POWDER, FOR SOLUTION INTRAMUSCULAR; INTRAVENOUS at 18:38

## 2019-07-26 RX ADMIN — SODIUM CHLORIDE, POTASSIUM CHLORIDE, SODIUM LACTATE AND CALCIUM CHLORIDE 75 ML/HR: 600; 310; 30; 20 INJECTION, SOLUTION INTRAVENOUS at 18:15

## 2019-07-26 RX ADMIN — TERBUTALINE SULFATE 0.25 MG: 1 INJECTION SUBCUTANEOUS at 03:45

## 2019-07-26 RX ADMIN — BUTORPHANOL TARTRATE 1 MG: 2 INJECTION, SOLUTION INTRAMUSCULAR; INTRAVENOUS at 23:29

## 2019-07-26 RX ADMIN — TERBUTALINE SULFATE 0.25 MG: 1 INJECTION SUBCUTANEOUS at 02:56

## 2019-07-26 RX ADMIN — MAGNESIUM SULFATE HEPTAHYDRATE 2 G/HR: 40 INJECTION, SOLUTION INTRAVENOUS at 19:26

## 2019-07-27 ENCOUNTER — ANESTHESIA (OUTPATIENT)
Dept: LABOR AND DELIVERY | Facility: HOSPITAL | Age: 19
End: 2019-07-27

## 2019-07-27 ENCOUNTER — ANESTHESIA EVENT (OUTPATIENT)
Dept: LABOR AND DELIVERY | Facility: HOSPITAL | Age: 19
End: 2019-07-27

## 2019-07-27 PROBLEM — Z34.90 PREGNANCY: Status: RESOLVED | Noted: 2019-06-26 | Resolved: 2019-07-27

## 2019-07-27 PROBLEM — O42.919 PRETERM PREMATURE RUPTURE OF MEMBRANES (PPROM) WITH UNKNOWN ONSET OF LABOR: Status: RESOLVED | Noted: 2019-07-26 | Resolved: 2019-07-27

## 2019-07-27 LAB
ABO GROUP BLD: NORMAL
ATMOSPHERIC PRESS: 757.6 MMHG
ATMOSPHERIC PRESS: 758 MMHG
BASE EXCESS BLDCOA CALC-SCNC: -1.5 MMOL/L
BASE EXCESS BLDCOV CALC-SCNC: -1.7 MMOL/L (ref -30–30)
BDY SITE: ABNORMAL
BDY SITE: ABNORMAL
FETAL BLEED: NEGATIVE
HCO3 BLDCOA-SCNC: 24.3 MMOL/L (ref 22–28)
HCO3 BLDCOV-SCNC: 22.7 MMOL/L
MODALITY: ABNORMAL
MODALITY: ABNORMAL
NOTE: ABNORMAL
NOTE: ABNORMAL
NUMBER OF DOSES: NORMAL
PCO2 BLDCOA: 44.2 MMHG
PCO2 BLDCOV: 37.2 MM HG (ref 35–51.3)
PH BLDCOA: 7.35 PH UNITS (ref 7.18–7.34)
PH BLDCOV: 7.39 PH UNITS (ref 7.26–7.4)
PO2 BLDCOA: 23.3 MMHG (ref 12–26)
PO2 BLDCOV: 31.9 MM HG (ref 19–39)
RH BLD: NEGATIVE
SAO2 % BLDCOA: 37.5 % (ref 92–99)
SAO2 % BLDCOA: 61.5 % (ref 92–99)
SAO2 % BLDCOV: ABNORMAL %

## 2019-07-27 PROCEDURE — 88307 TISSUE EXAM BY PATHOLOGIST: CPT

## 2019-07-27 PROCEDURE — C1755 CATHETER, INTRASPINAL: HCPCS

## 2019-07-27 PROCEDURE — 25010000002 RHO D IMMUNE GLOBULIN 1500 UNIT/2ML SOLUTION PREFILLED SYRINGE: Performed by: OBSTETRICS & GYNECOLOGY

## 2019-07-27 PROCEDURE — 86901 BLOOD TYPING SEROLOGIC RH(D): CPT | Performed by: OBSTETRICS & GYNECOLOGY

## 2019-07-27 PROCEDURE — C1755 CATHETER, INTRASPINAL: HCPCS | Performed by: ANESTHESIOLOGY

## 2019-07-27 PROCEDURE — 25010000002 AMPICILLIN PER 500 MG: Performed by: OBSTETRICS & GYNECOLOGY

## 2019-07-27 PROCEDURE — 25010000002 BUTORPHANOL PER 1 MG: Performed by: OBSTETRICS & GYNECOLOGY

## 2019-07-27 PROCEDURE — 86900 BLOOD TYPING SEROLOGIC ABO: CPT | Performed by: OBSTETRICS & GYNECOLOGY

## 2019-07-27 PROCEDURE — 82803 BLOOD GASES ANY COMBINATION: CPT

## 2019-07-27 PROCEDURE — 59400 OBSTETRICAL CARE: CPT | Performed by: OBSTETRICS & GYNECOLOGY

## 2019-07-27 PROCEDURE — 85461 HEMOGLOBIN FETAL: CPT | Performed by: OBSTETRICS & GYNECOLOGY

## 2019-07-27 PROCEDURE — 0UQMXZZ REPAIR VULVA, EXTERNAL APPROACH: ICD-10-PCS | Performed by: OBSTETRICS & GYNECOLOGY

## 2019-07-27 RX ORDER — HYDROCODONE BITARTRATE AND ACETAMINOPHEN 5; 325 MG/1; MG/1
1 TABLET ORAL EVERY 4 HOURS PRN
Status: DISCONTINUED | OUTPATIENT
Start: 2019-07-27 | End: 2019-07-29 | Stop reason: HOSPADM

## 2019-07-27 RX ORDER — PRENATAL VIT NO.126/IRON/FOLIC 28MG-0.8MG
1 TABLET ORAL DAILY
Status: DISCONTINUED | OUTPATIENT
Start: 2019-07-27 | End: 2019-07-29 | Stop reason: HOSPADM

## 2019-07-27 RX ORDER — ERYTHROMYCIN 5 MG/G
OINTMENT OPHTHALMIC
Status: DISPENSED
Start: 2019-07-27 | End: 2019-07-27

## 2019-07-27 RX ORDER — ONDANSETRON 2 MG/ML
4 INJECTION INTRAMUSCULAR; INTRAVENOUS ONCE AS NEEDED
Status: DISCONTINUED | OUTPATIENT
Start: 2019-07-27 | End: 2019-07-27 | Stop reason: HOSPADM

## 2019-07-27 RX ORDER — CARBOPROST TROMETHAMINE 250 UG/ML
250 INJECTION, SOLUTION INTRAMUSCULAR AS NEEDED
Status: DISCONTINUED | OUTPATIENT
Start: 2019-07-27 | End: 2019-07-27 | Stop reason: HOSPADM

## 2019-07-27 RX ORDER — TRANEXAMIC ACID 100 MG/ML
1000 INJECTION, SOLUTION INTRAVENOUS ONCE AS NEEDED
Status: DISCONTINUED | OUTPATIENT
Start: 2019-07-27 | End: 2019-07-27 | Stop reason: HOSPADM

## 2019-07-27 RX ORDER — FAMOTIDINE 10 MG/ML
20 INJECTION, SOLUTION INTRAVENOUS ONCE AS NEEDED
Status: DISCONTINUED | OUTPATIENT
Start: 2019-07-27 | End: 2019-07-27 | Stop reason: HOSPADM

## 2019-07-27 RX ORDER — LIDOCAINE HYDROCHLORIDE AND EPINEPHRINE 15; 5 MG/ML; UG/ML
INJECTION, SOLUTION EPIDURAL AS NEEDED
Status: DISCONTINUED | OUTPATIENT
Start: 2019-07-27 | End: 2019-07-27 | Stop reason: SURG

## 2019-07-27 RX ORDER — PHYTONADIONE 1 MG/.5ML
INJECTION, EMULSION INTRAMUSCULAR; INTRAVENOUS; SUBCUTANEOUS
Status: DISPENSED
Start: 2019-07-27 | End: 2019-07-27

## 2019-07-27 RX ORDER — OXYTOCIN-SODIUM CHLORIDE 0.9% IV SOLN 30 UNIT/500ML 30-0.9/5 UT/ML-%
SOLUTION INTRAVENOUS
Status: COMPLETED
Start: 2019-07-27 | End: 2019-07-27

## 2019-07-27 RX ORDER — HYDROXYZINE 50 MG/1
50 TABLET, FILM COATED ORAL NIGHTLY PRN
Status: DISCONTINUED | OUTPATIENT
Start: 2019-07-27 | End: 2019-07-29 | Stop reason: HOSPADM

## 2019-07-27 RX ORDER — SODIUM CHLORIDE 0.9 % (FLUSH) 0.9 %
1-10 SYRINGE (ML) INJECTION AS NEEDED
Status: DISCONTINUED | OUTPATIENT
Start: 2019-07-27 | End: 2019-07-29 | Stop reason: HOSPADM

## 2019-07-27 RX ORDER — BISACODYL 10 MG
10 SUPPOSITORY, RECTAL RECTAL DAILY PRN
Status: DISCONTINUED | OUTPATIENT
Start: 2019-07-28 | End: 2019-07-29 | Stop reason: HOSPADM

## 2019-07-27 RX ORDER — MISOPROSTOL 200 UG/1
800 TABLET ORAL AS NEEDED
Status: DISCONTINUED | OUTPATIENT
Start: 2019-07-27 | End: 2019-07-27 | Stop reason: HOSPADM

## 2019-07-27 RX ORDER — EPHEDRINE SULFATE 50 MG/ML
10 INJECTION, SOLUTION INTRAVENOUS
Status: DISCONTINUED | OUTPATIENT
Start: 2019-07-27 | End: 2019-07-27 | Stop reason: HOSPADM

## 2019-07-27 RX ORDER — IBUPROFEN 600 MG/1
600 TABLET ORAL EVERY 8 HOURS PRN
Status: DISCONTINUED | OUTPATIENT
Start: 2019-07-27 | End: 2019-07-29 | Stop reason: HOSPADM

## 2019-07-27 RX ORDER — METHYLERGONOVINE MALEATE 0.2 MG/ML
200 INJECTION INTRAVENOUS ONCE AS NEEDED
Status: DISCONTINUED | OUTPATIENT
Start: 2019-07-27 | End: 2019-07-27 | Stop reason: HOSPADM

## 2019-07-27 RX ORDER — OXYTOCIN-SODIUM CHLORIDE 0.9% IV SOLN 30 UNIT/500ML 30-0.9/5 UT/ML-%
250 SOLUTION INTRAVENOUS CONTINUOUS
Status: ACTIVE | OUTPATIENT
Start: 2019-07-27 | End: 2019-07-27

## 2019-07-27 RX ORDER — CALCIUM CARBONATE 200(500)MG
3 TABLET,CHEWABLE ORAL 3 TIMES DAILY PRN
Status: DISCONTINUED | OUTPATIENT
Start: 2019-07-27 | End: 2019-07-29 | Stop reason: HOSPADM

## 2019-07-27 RX ORDER — DOCUSATE SODIUM 100 MG/1
100 CAPSULE, LIQUID FILLED ORAL 2 TIMES DAILY PRN
Status: DISCONTINUED | OUTPATIENT
Start: 2019-07-27 | End: 2019-07-29 | Stop reason: HOSPADM

## 2019-07-27 RX ORDER — OXYTOCIN-SODIUM CHLORIDE 0.9% IV SOLN 30 UNIT/500ML 30-0.9/5 UT/ML-%
125 SOLUTION INTRAVENOUS CONTINUOUS PRN
Status: COMPLETED | OUTPATIENT
Start: 2019-07-27 | End: 2019-07-27

## 2019-07-27 RX ORDER — OXYTOCIN-SODIUM CHLORIDE 0.9% IV SOLN 30 UNIT/500ML 30-0.9/5 UT/ML-%
999 SOLUTION INTRAVENOUS ONCE
Status: COMPLETED | OUTPATIENT
Start: 2019-07-27 | End: 2019-07-27

## 2019-07-27 RX ORDER — ONDANSETRON 4 MG/1
4 TABLET, FILM COATED ORAL EVERY 8 HOURS PRN
Status: DISCONTINUED | OUTPATIENT
Start: 2019-07-27 | End: 2019-07-29 | Stop reason: HOSPADM

## 2019-07-27 RX ADMIN — IBUPROFEN 600 MG: 600 TABLET ORAL at 22:45

## 2019-07-27 RX ADMIN — OXYTOCIN-SODIUM CHLORIDE 0.9% IV SOLN 30 UNIT/500ML 30 UNITS: 30-0.9/5 SOLUTION at 11:10

## 2019-07-27 RX ADMIN — AMPICILLIN SODIUM 2 G: 2 INJECTION, POWDER, FOR SOLUTION INTRAMUSCULAR; INTRAVENOUS at 06:38

## 2019-07-27 RX ADMIN — OXYTOCIN 125 ML/HR: 10 INJECTION INTRAVENOUS at 12:41

## 2019-07-27 RX ADMIN — AMPICILLIN SODIUM 2 G: 2 INJECTION, POWDER, FOR SOLUTION INTRAMUSCULAR; INTRAVENOUS at 00:41

## 2019-07-27 RX ADMIN — SODIUM CHLORIDE, POTASSIUM CHLORIDE, SODIUM LACTATE AND CALCIUM CHLORIDE 125 ML/HR: 600; 310; 30; 20 INJECTION, SOLUTION INTRAVENOUS at 08:11

## 2019-07-27 RX ADMIN — HUMAN RHO(D) IMMUNE GLOBULIN 1500 UNITS: 1500 SOLUTION INTRAMUSCULAR; INTRAVENOUS at 16:55

## 2019-07-27 RX ADMIN — SODIUM CHLORIDE, POTASSIUM CHLORIDE, SODIUM LACTATE AND CALCIUM CHLORIDE 75 ML/HR: 600; 310; 30; 20 INJECTION, SOLUTION INTRAVENOUS at 03:35

## 2019-07-27 RX ADMIN — BUTORPHANOL TARTRATE 1 MG: 2 INJECTION, SOLUTION INTRAMUSCULAR; INTRAVENOUS at 01:49

## 2019-07-27 RX ADMIN — MAGNESIUM SULFATE HEPTAHYDRATE 2 G/HR: 40 INJECTION, SOLUTION INTRAVENOUS at 01:51

## 2019-07-27 RX ADMIN — LIDOCAINE HYDROCHLORIDE AND EPINEPHRINE 3 ML: 15; 5 INJECTION, SOLUTION EPIDURAL at 07:49

## 2019-07-27 RX ADMIN — Medication 10 ML/HR: at 07:52

## 2019-07-27 RX ADMIN — OXYTOCIN 30 UNITS: 10 INJECTION INTRAVENOUS at 11:10

## 2019-07-27 RX ADMIN — BUTORPHANOL TARTRATE 2 MG: 2 INJECTION, SOLUTION INTRAMUSCULAR; INTRAVENOUS at 05:18

## 2019-07-27 NOTE — ANESTHESIA PREPROCEDURE EVALUATION
Anesthesia Evaluation     Patient summary reviewed and Nursing notes reviewed   no history of anesthetic complications:               Airway   Mallampati: II  TM distance: >3 FB  Neck ROM: full  no difficulty expected  Dental - normal exam     Pulmonary - negative pulmonary ROS    breath sounds clear to auscultation  (-) shortness of breath, sleep apnea, decreased breath sounds, wheezes  Cardiovascular - normal exam  Exercise tolerance: good (4-7 METS)    Rhythm: regular  Rate: normal    (-) past MI, angina, CHF, orthopnea, PND, MATA, PVD      Neuro/Psych- negative ROS  (-) seizures, neuromuscular disease, TIA, CVA, dizziness/light headedness, weakness, numbness  GI/Hepatic/Renal/Endo - negative ROS   (-) liver disease, diabetes    Musculoskeletal (-) negative ROS    Abdominal  - normal exam   Substance History - negative use  (-) alcohol use, drug use     OB/GYN    (+) Pregnant ( @ 32w3d),         Other - negative ROS                     Anesthesia Plan    ASA 2     epidural     Anesthetic plan, all risks, benefits, and alternatives have been provided, discussed and informed consent has been obtained with: patient and father.

## 2019-07-27 NOTE — ANESTHESIA PROCEDURE NOTES
Labor Epidural      Patient reassessed immediately prior to procedure    Patient location during procedure: OB  Performed By  Anesthesiologist: Tomi Honre MD  Preanesthetic Checklist  Completed: patient identified, surgical consent, pre-op evaluation, timeout performed, IV checked, risks and benefits discussed and monitors and equipment checked  Additional Notes  LMP 11/25/2018 (Exact Date)     Prep:  Pt Position:sitting  Sterile Tech:cap, gloves, mask and sterile barrier  Prep:chlorhexidine gluconate and isopropyl alcohol  Monitoring:blood pressure monitoring, continuous pulse oximetry and EKG  Epidural Block Procedure:  Approach:midline  Guidance:landmark technique and palpation technique  Location:L4-L5  Needle Type:Tuohy  Needle Gauge:17  Loss of Resistance Medium: saline  Loss of Resistance: 4cm  Cath Depth at skin:9 cm  Paresthesia: none  Aspiration:negative  Test Dose:negative  Number of Attempts: 1  Post Assessment:  Dressing:occlusive dressing applied and secured with tape  Pt Tolerance:patient tolerated the procedure well with no apparent complications  Complications:no

## 2019-07-28 LAB
BASOPHILS # BLD AUTO: 0.05 10*3/MM3 (ref 0–0.2)
BASOPHILS NFR BLD AUTO: 0.3 % (ref 0–1.5)
DEPRECATED RDW RBC AUTO: 41.8 FL (ref 37–54)
EOSINOPHIL # BLD AUTO: 0.13 10*3/MM3 (ref 0–0.4)
EOSINOPHIL NFR BLD AUTO: 0.9 % (ref 0.3–6.2)
ERYTHROCYTE [DISTWIDTH] IN BLOOD BY AUTOMATED COUNT: 12.1 % (ref 12.3–15.4)
HCT VFR BLD AUTO: 29.9 % (ref 34–46.6)
HGB BLD-MCNC: 9.8 G/DL (ref 12–15.9)
IMM GRANULOCYTES # BLD AUTO: 0.36 10*3/MM3 (ref 0–0.05)
IMM GRANULOCYTES NFR BLD AUTO: 2.4 % (ref 0–0.5)
LYMPHOCYTES # BLD AUTO: 2.94 10*3/MM3 (ref 0.7–3.1)
LYMPHOCYTES NFR BLD AUTO: 19.5 % (ref 19.6–45.3)
MCH RBC QN AUTO: 31.5 PG (ref 26.6–33)
MCHC RBC AUTO-ENTMCNC: 32.8 G/DL (ref 31.5–35.7)
MCV RBC AUTO: 96.1 FL (ref 79–97)
MONOCYTES # BLD AUTO: 0.97 10*3/MM3 (ref 0.1–0.9)
MONOCYTES NFR BLD AUTO: 6.4 % (ref 5–12)
NEUTROPHILS # BLD AUTO: 10.59 10*3/MM3 (ref 1.7–7)
NEUTROPHILS NFR BLD AUTO: 70.5 % (ref 42.7–76)
NRBC BLD AUTO-RTO: 0 /100 WBC (ref 0–0.2)
PLATELET # BLD AUTO: 148 10*3/MM3 (ref 140–450)
PMV BLD AUTO: 10.9 FL (ref 6–12)
RBC # BLD AUTO: 3.11 10*6/MM3 (ref 3.77–5.28)
WBC NRBC COR # BLD: 15.04 10*3/MM3 (ref 3.4–10.8)

## 2019-07-28 PROCEDURE — 99024 POSTOP FOLLOW-UP VISIT: CPT | Performed by: OBSTETRICS & GYNECOLOGY

## 2019-07-28 PROCEDURE — 85025 COMPLETE CBC W/AUTO DIFF WBC: CPT | Performed by: OBSTETRICS & GYNECOLOGY

## 2019-07-28 RX ADMIN — DOCUSATE SODIUM 100 MG: 100 CAPSULE, LIQUID FILLED ORAL at 08:47

## 2019-07-28 RX ADMIN — Medication 1 TABLET: at 08:47

## 2019-07-28 RX ADMIN — IBUPROFEN 600 MG: 600 TABLET ORAL at 08:47

## 2019-07-28 RX ADMIN — IBUPROFEN 600 MG: 600 TABLET ORAL at 21:46

## 2019-07-29 VITALS
TEMPERATURE: 97.7 F | BODY MASS INDEX: 21.03 KG/M2 | OXYGEN SATURATION: 100 % | HEIGHT: 67 IN | RESPIRATION RATE: 18 BRPM | HEART RATE: 71 BPM | WEIGHT: 134 LBS | DIASTOLIC BLOOD PRESSURE: 57 MMHG | SYSTOLIC BLOOD PRESSURE: 96 MMHG

## 2019-07-29 PROBLEM — O60.00 PRETERM LABOR: Status: RESOLVED | Noted: 2019-07-16 | Resolved: 2019-07-29

## 2019-07-29 PROBLEM — Z87.59 HISTORY OF PRETERM PREMATURE RUPTURE OF MEMBRANES (PPROM): Status: ACTIVE | Noted: 2019-07-29

## 2019-07-29 RX ORDER — IBUPROFEN 600 MG/1
600 TABLET ORAL EVERY 6 HOURS PRN
Qty: 30 TABLET | Refills: 2 | Status: SHIPPED | OUTPATIENT
Start: 2019-07-29 | End: 2019-12-05

## 2019-07-29 RX ADMIN — Medication 1 TABLET: at 08:37

## 2019-07-29 RX ADMIN — IBUPROFEN 600 MG: 600 TABLET ORAL at 08:37

## 2019-07-29 RX ADMIN — DOCUSATE SODIUM 100 MG: 100 CAPSULE, LIQUID FILLED ORAL at 08:37

## 2019-08-12 ENCOUNTER — TELEPHONE (OUTPATIENT)
Dept: OBSTETRICS AND GYNECOLOGY | Facility: CLINIC | Age: 19
End: 2019-08-12

## 2019-08-12 NOTE — TELEPHONE ENCOUNTER
Pt delivered 07/27/2019. She had a tear when delivering. She believes site where stitches are is infected. Please advise.         Thank you

## 2019-08-12 NOTE — TELEPHONE ENCOUNTER
Patient describes having brown-yellow discharge per her vagina consistent with lochia.  She is not having any swelling or unusual problems where her suture of her labia is and no fever or other symptoms.  Patient given instructions of what to watch for and if she is having any swelling or increasing pain or fever she does need to get checked.  If it is just her brownish-yellow discharge this is probably lochia and nothing to worry about.

## 2019-08-14 ENCOUNTER — TELEPHONE (OUTPATIENT)
Dept: OBSTETRICS AND GYNECOLOGY | Facility: CLINIC | Age: 19
End: 2019-08-14

## 2019-08-14 NOTE — TELEPHONE ENCOUNTER
Pt is calling asking for a work note for being out of work from 07/16/2019 - 09/05/2019. She needs this faxed to [phone].       Thank you

## 2019-09-05 ENCOUNTER — POSTPARTUM VISIT (OUTPATIENT)
Dept: OBSTETRICS AND GYNECOLOGY | Facility: CLINIC | Age: 19
End: 2019-09-05

## 2019-09-05 VITALS
BODY MASS INDEX: 18.05 KG/M2 | SYSTOLIC BLOOD PRESSURE: 120 MMHG | WEIGHT: 115 LBS | DIASTOLIC BLOOD PRESSURE: 77 MMHG | HEIGHT: 67 IN

## 2019-09-05 PROCEDURE — 0503F POSTPARTUM CARE VISIT: CPT | Performed by: OBSTETRICS & GYNECOLOGY

## 2019-09-05 RX ORDER — NORGESTIMATE AND ETHINYL ESTRADIOL 0.25-0.035
1 KIT ORAL DAILY
Qty: 84 TABLET | Refills: 3 | OUTPATIENT
Start: 2019-09-05 | End: 2022-04-21

## 2019-09-05 NOTE — PROGRESS NOTES
Subjective    Chief Complaint   Patient presents with   • Postpartum Care     6WKS VAG, BOY, 4LBS 1.3OZ, DR. RODRÍGUEZ, BOTTLE, DISCUS BC      History of Present Illness    Nannette Godinez is a 19 y.o. female who presents for postpartum visit.  Patient due for a Pap smear.  Patient would like MonoNessa OCPs.  Went over pathology of placenta in detail.  No current problems.    Obstetric History:  OB History      Para Term  AB Living    1 1   1   1    SAB TAB Ectopic Molar Multiple Live Births            0 1         Menstrual History:     Patient's last menstrual period was 2018 (exact date).       Past Medical History:   Diagnosis Date   • Deliberate self-cutting     Pt has old scars on both legs; no problems since 15     Family History   Problem Relation Age of Onset   • Hypertension Paternal Uncle      Social History     Tobacco Use   Smoking Status Former Smoker   • Packs/day: 0.00   • Types: Cigarettes   Smokeless Tobacco Never Used     [unfilled]    The following portions of the patient's history were reviewed and updated as appropriate: allergies, current medications, past family history, past medical history, past social history, past surgical history and problem list.    Review of Systems   Constitutional: Negative.  Negative for fever and unexpected weight change.   HENT: Negative.    Respiratory: Negative for shortness of breath and wheezing.    Cardiovascular: Negative for chest pain, palpitations and leg swelling.   Gastrointestinal: Negative for abdominal pain, anal bleeding and blood in stool.   Genitourinary: Negative for dysuria, pelvic pain, urgency, vaginal bleeding, vaginal discharge and vaginal pain.   Skin: Negative.    Neurological: Negative.    Hematological: Negative.  Negative for adenopathy.   Psychiatric/Behavioral: Negative.  Negative for dysphoric mood. The patient is not nervous/anxious.             Objective   Physical Exam   Constitutional: She is oriented to person,  "place, and time. Vital signs are normal. She appears well-developed and well-nourished.   HENT:   Head: Normocephalic.   Neck: Trachea normal. No tracheal deviation present. No thyromegaly present.   Cardiovascular: Normal rate, regular rhythm and normal heart sounds.   No murmur heard.  Pulmonary/Chest: Effort normal and breath sounds normal.   Breasts without masses, tenderness or nipple discharge   Abdominal: Soft. Normal appearance. She exhibits no mass. There is no hepatosplenomegaly. There is no tenderness. No hernia.   Genitourinary: Rectum normal, vagina normal and uterus normal. Uterus is not enlarged and not tender. Cervix exhibits no motion tenderness. Right adnexum displays no mass and no tenderness. Left adnexum displays no mass and no tenderness. No vaginal discharge found.   Genitourinary Comments: External genitalia normal    Lymphadenopathy:     She has no cervical adenopathy.     She has no axillary adenopathy.   Neurological: She is alert and oriented to person, place, and time.   Skin: Skin is warm and dry. No rash noted.   Psychiatric: She has a normal mood and affect. Her behavior is normal. Cognition and memory are normal.       /77   Ht 170.2 cm (67\")   Wt 52.2 kg (115 lb)   LMP 11/25/2018 (Exact Date)   BMI 18.01 kg/m²     Assessment/Plan   Nannette was seen today for postpartum care.    Diagnoses and all orders for this visit:    Postpartum follow-up  -     IGP,rfx Aptima HPV All Pth    Other orders  -     norgestimate-ethinyl estradiol (MONONESSA) 0.25-35 MG-MCG per tablet; Take 1 tablet by mouth Daily.        Return to office 1 year.             "

## 2019-09-09 LAB
CONV .: NORMAL
CYTOLOGIST CVX/VAG CYTO: NORMAL
CYTOLOGY CVX/VAG DOC CYTO: NORMAL
CYTOLOGY CVX/VAG DOC THIN PREP: NORMAL
DX ICD CODE: NORMAL
HIV 1 & 2 AB SER-IMP: NORMAL
OTHER STN SPEC: NORMAL
STAT OF ADQ CVX/VAG CYTO-IMP: NORMAL

## 2019-12-05 ENCOUNTER — CLINICAL SUPPORT (OUTPATIENT)
Dept: OBSTETRICS AND GYNECOLOGY | Facility: CLINIC | Age: 19
End: 2019-12-05

## 2019-12-05 ENCOUNTER — OFFICE VISIT (OUTPATIENT)
Dept: OBSTETRICS AND GYNECOLOGY | Facility: CLINIC | Age: 19
End: 2019-12-05

## 2019-12-05 VITALS
HEIGHT: 67 IN | SYSTOLIC BLOOD PRESSURE: 119 MMHG | WEIGHT: 112 LBS | BODY MASS INDEX: 17.58 KG/M2 | DIASTOLIC BLOOD PRESSURE: 76 MMHG

## 2019-12-05 DIAGNOSIS — Z71.6 ENCOUNTER FOR SMOKING CESSATION COUNSELING: ICD-10-CM

## 2019-12-05 DIAGNOSIS — Z30.42 ENCOUNTER FOR DEPO-PROVERA CONTRACEPTION: Primary | ICD-10-CM

## 2019-12-05 DIAGNOSIS — Z30.013 ENCOUNTER FOR INITIAL PRESCRIPTION OF INJECTABLE CONTRACEPTIVE: Primary | ICD-10-CM

## 2019-12-05 PROCEDURE — 96372 THER/PROPH/DIAG INJ SC/IM: CPT | Performed by: OBSTETRICS & GYNECOLOGY

## 2019-12-05 PROCEDURE — 99213 OFFICE O/P EST LOW 20 MIN: CPT | Performed by: OBSTETRICS & GYNECOLOGY

## 2019-12-05 RX ORDER — MEDROXYPROGESTERONE ACETATE 150 MG/ML
150 INJECTION, SUSPENSION INTRAMUSCULAR ONCE
Status: COMPLETED | OUTPATIENT
Start: 2019-12-05 | End: 2019-12-05

## 2019-12-05 RX ORDER — MEDROXYPROGESTERONE ACETATE 150 MG/ML
150 INJECTION, SUSPENSION INTRAMUSCULAR
Qty: 1 ML | Refills: 3
Start: 2019-12-05 | End: 2023-02-04

## 2019-12-05 RX ADMIN — MEDROXYPROGESTERONE ACETATE 150 MG: 150 INJECTION, SUSPENSION INTRAMUSCULAR at 14:40

## 2019-12-05 NOTE — PROGRESS NOTES
"Subjective    Chief Complaint   Patient presents with   • Follow-up     Discuss getting depo injection       History of Present Illness    Nannette Godinez is a 19 y.o. female who presents for counseling for smoking cessation and birth control.  Patient has been using birth control pills but forgets to take them and has taken none for 2 weeks and is currently on her menses.  She would like to try Depo-Provera injections and we discussed the side effects including weight gain and bleeding.  She wants to come back today if possible and get the injection after filling at the pharmacy.  We also discussed Chantix and its pros and cons including possible depression.  Patient has had no depression problems she said for several years and would like to try it.    Obstetric History:  OB History      Para Term  AB Living    1 1   1   1    SAB TAB Ectopic Molar Multiple Live Births            0 1         Menstrual History:     Patient's last menstrual period was 2019.       Past Medical History:   Diagnosis Date   • Deliberate self-cutting     Pt has old scars on both legs; no problems since 15     Family History   Problem Relation Age of Onset   • Hypertension Paternal Uncle        The following portions of the patient's history were reviewed and updated as appropriate: allergies, current medications, past family history, past medical history, past social history, past surgical history and problem list.    Review of Systems  Negative       Objective   Physical Exam  No exam today  /76   Ht 170.2 cm (67\")   Wt 50.8 kg (112 lb)   LMP 2019   BMI 17.54 kg/m²     Assessment/Plan   Nannette was seen today for follow-up.    Diagnoses and all orders for this visit:    Encounter for initial prescription of injectable contraceptive    Encounter for smoking cessation counseling    Other orders  -     medroxyPROGESTERone (DEPO-PROVERA) 150 MG/ML injection; Inject 1 mL into the appropriate muscle as directed " by prescriber Every 3 (Three) Months.        Also gave prescription for Chantix 1 month starter pack followed by 4-month refill packs  Return office 1 year or as needed

## 2019-12-05 NOTE — PROGRESS NOTES
Patient here for DepoProvera 150 mg injection-given right deltoid IM. Patient tolerated well with no reaction.  Pt. Supplied.  NDC [address]-7  LOT [id number]  EXP 8/20

## 2020-02-13 ENCOUNTER — OFFICE VISIT (OUTPATIENT)
Dept: OBSTETRICS AND GYNECOLOGY | Facility: CLINIC | Age: 20
End: 2020-02-13

## 2020-02-13 VITALS
HEIGHT: 67 IN | BODY MASS INDEX: 17.58 KG/M2 | WEIGHT: 112 LBS | DIASTOLIC BLOOD PRESSURE: 78 MMHG | SYSTOLIC BLOOD PRESSURE: 118 MMHG

## 2020-02-13 DIAGNOSIS — R10.9 ABDOMINAL PAIN, UNSPECIFIED ABDOMINAL LOCATION: Primary | ICD-10-CM

## 2020-02-13 PROCEDURE — 99213 OFFICE O/P EST LOW 20 MIN: CPT | Performed by: OBSTETRICS & GYNECOLOGY

## 2020-02-13 NOTE — PROGRESS NOTES
"Subjective    Chief Complaint   Patient presents with   • Follow-up     Ovarian cysts abd pain       History of Present Illness    Nannette Godinez is a 19 y.o. female who presents for question of ovarian cyst.  Patient had some upper abdominal pain earlier in the week and someone told her it may be a ruptured cyst.  She is having no pain now and is on her menses.  She states she never had lower pelvic pain.  She is on Depo-Provera for birth control.  She was worried because she was told she may have PCOS at age 15.    Obstetric History:  OB History        1    Para   1    Term           1    AB        Living   1       SAB        TAB        Ectopic        Molar        Multiple   0    Live Births   1               Menstrual History:     No LMP recorded.       Past Medical History:   Diagnosis Date   • Deliberate self-cutting     Pt has old scars on both legs; no problems since 15     Family History   Problem Relation Age of Onset   • Hypertension Paternal Uncle        The following portions of the patient's history were reviewed and updated as appropriate: allergies, current medications, past family history, past medical history, past social history, past surgical history and problem list.    Review of Systems  Negative today       Objective   Physical Exam  Vagina clear except some old blood from menses.  Cervix without lesion.  Bimanual exam negative for any uterine or adnexal tenderness or mass.  /78   Ht 170.2 cm (67\")   Wt 50.8 kg (112 lb)   BMI 17.54 kg/m²     Assessment/Plan   Nannette was seen today for follow-up.    Diagnoses and all orders for this visit:    Abdominal pain, unspecified abdominal location        Impression and plan.  Patient is totally asymptomatic today and has no exam or symptoms consistent with ovarian cysts.  Patient knows to call if she has any pelvic pain develop and we will immediately get a pelvic ultrasound at that time..  Patient was mostly here for " reassurance.

## 2020-03-05 ENCOUNTER — CLINICAL SUPPORT (OUTPATIENT)
Dept: OBSTETRICS AND GYNECOLOGY | Facility: CLINIC | Age: 20
End: 2020-03-05

## 2020-03-05 DIAGNOSIS — Z30.42 ENCOUNTER FOR DEPO-PROVERA CONTRACEPTION: Primary | ICD-10-CM

## 2020-03-05 PROCEDURE — 96372 THER/PROPH/DIAG INJ SC/IM: CPT | Performed by: OBSTETRICS & GYNECOLOGY

## 2020-03-05 RX ORDER — MEDROXYPROGESTERONE ACETATE 150 MG/ML
150 INJECTION, SUSPENSION INTRAMUSCULAR ONCE
Status: COMPLETED | OUTPATIENT
Start: 2020-03-05 | End: 2020-03-05

## 2020-03-05 RX ADMIN — MEDROXYPROGESTERONE ACETATE 150 MG: 150 INJECTION, SUSPENSION INTRAMUSCULAR at 15:20

## 2020-03-05 NOTE — PROGRESS NOTES
Pt here for pt supplied depo injection. Pt tolerated injection without a reaction. Rt Deltoid.    NDC:[address]-7  LOT:[id number]  EXP:12/2021

## 2020-03-05 NOTE — PATIENT INSTRUCTIONS
Pt here for pt supplied depo injection. Pt tolerated injection without a reaction. Rt Deltoid.    NDC:[address]-8  LOT:[id number]  EXP:12/2021

## 2020-05-21 ENCOUNTER — CLINICAL SUPPORT (OUTPATIENT)
Dept: OBSTETRICS AND GYNECOLOGY | Facility: CLINIC | Age: 20
End: 2020-05-21

## 2020-05-21 DIAGNOSIS — Z30.42 ENCOUNTER FOR DEPO-PROVERA CONTRACEPTION: Primary | ICD-10-CM

## 2020-05-21 PROCEDURE — 96372 THER/PROPH/DIAG INJ SC/IM: CPT | Performed by: OBSTETRICS & GYNECOLOGY

## 2020-05-21 RX ORDER — MEDROXYPROGESTERONE ACETATE 150 MG/ML
150 INJECTION, SUSPENSION INTRAMUSCULAR ONCE
Status: COMPLETED | OUTPATIENT
Start: 2020-05-21 | End: 2020-05-21

## 2020-05-21 RX ADMIN — MEDROXYPROGESTERONE ACETATE 150 MG: 150 INJECTION, SUSPENSION INTRAMUSCULAR at 13:23

## 2020-05-21 NOTE — PATIENT INSTRUCTIONS
Pt here for pt supplied depo injection. Pt tolerated injection without a reaction. Rt Deltoid    NDC:[address]-3  LOT:[id number]  EXP:04/22

## 2021-02-01 NOTE — PROGRESS NOTES
Pt here for pt supplied depo injection. Pt tolerated injection without a reaction. Rt Deltoid    NDC:[address]-2  LOT:[id number]  EXP:04/22   Protrusion of hernia/activity/movement

## 2021-02-02 ENCOUNTER — HOSPITAL ENCOUNTER (EMERGENCY)
Facility: HOSPITAL | Age: 21
Discharge: HOME | End: 2021-02-02
Attending: EMERGENCY MEDICINE | Admitting: EMERGENCY MEDICINE

## 2021-02-02 VITALS
BODY MASS INDEX: 17.27 KG/M2 | HEIGHT: 67 IN | DIASTOLIC BLOOD PRESSURE: 81 MMHG | RESPIRATION RATE: 17 BRPM | SYSTOLIC BLOOD PRESSURE: 118 MMHG | OXYGEN SATURATION: 99 % | WEIGHT: 110 LBS | HEART RATE: 75 BPM | TEMPERATURE: 98.7 F

## 2021-02-02 DIAGNOSIS — Z32.02 PREGNANCY TEST NEGATIVE: ICD-10-CM

## 2021-02-02 DIAGNOSIS — N93.9 EPISODE OF HEAVY VAGINAL BLEEDING: Primary | ICD-10-CM

## 2021-02-02 DIAGNOSIS — R10.9 ABDOMINAL CRAMPING: ICD-10-CM

## 2021-02-02 LAB
ABO GROUP BLD: NORMAL
ALBUMIN SERPL-MCNC: 4.5 G/DL (ref 3.5–5.2)
ALBUMIN/GLOB SERPL: 2 G/DL
ALP SERPL-CCNC: 54 U/L (ref 39–117)
ALT SERPL W P-5'-P-CCNC: 7 U/L (ref 1–33)
ANION GAP SERPL CALCULATED.3IONS-SCNC: 10.8 MMOL/L (ref 5–15)
AST SERPL-CCNC: 15 U/L (ref 1–32)
BASOPHILS # BLD AUTO: 0.03 10*3/MM3 (ref 0–0.2)
BASOPHILS NFR BLD AUTO: 0.4 % (ref 0–1.5)
BILIRUB SERPL-MCNC: 0.5 MG/DL (ref 0–1.2)
BILIRUB UR QL STRIP: ABNORMAL
BUN SERPL-MCNC: 10 MG/DL (ref 6–20)
BUN/CREAT SERPL: 14.9 (ref 7–25)
CALCIUM SPEC-SCNC: 9.6 MG/DL (ref 8.6–10.5)
CHLORIDE SERPL-SCNC: 102 MMOL/L (ref 98–107)
CLARITY UR: CLEAR
CLUE CELLS SPEC QL WET PREP: ABNORMAL
CO2 SERPL-SCNC: 26.2 MMOL/L (ref 22–29)
COLOR UR: ABNORMAL
CREAT SERPL-MCNC: 0.67 MG/DL (ref 0.57–1)
DEPRECATED RDW RBC AUTO: 39.5 FL (ref 37–54)
EOSINOPHIL # BLD AUTO: 0.05 10*3/MM3 (ref 0–0.4)
EOSINOPHIL NFR BLD AUTO: 0.6 % (ref 0.3–6.2)
ERYTHROCYTE [DISTWIDTH] IN BLOOD BY AUTOMATED COUNT: 11.9 % (ref 12.3–15.4)
GFR SERPL CREATININE-BSD FRML MDRD: 111 ML/MIN/1.73
GLOBULIN UR ELPH-MCNC: 2.3 GM/DL
GLUCOSE SERPL-MCNC: 104 MG/DL (ref 65–99)
GLUCOSE UR STRIP-MCNC: NEGATIVE MG/DL
HCG INTACT+B SERPL-ACNC: <0.5 MIU/ML
HCG SERPL QL: NEGATIVE
HCT VFR BLD AUTO: 40.5 % (ref 34–46.6)
HGB BLD-MCNC: 13.7 G/DL (ref 12–15.9)
HGB UR QL STRIP.AUTO: NEGATIVE
HYDATID CYST SPEC WET PREP: ABNORMAL
IMM GRANULOCYTES # BLD AUTO: 0.04 10*3/MM3 (ref 0–0.05)
IMM GRANULOCYTES NFR BLD AUTO: 0.5 % (ref 0–0.5)
KETONES UR QL STRIP: ABNORMAL
LEUKOCYTE ESTERASE UR QL STRIP.AUTO: NEGATIVE
LYMPHOCYTES # BLD AUTO: 1.66 10*3/MM3 (ref 0.7–3.1)
LYMPHOCYTES NFR BLD AUTO: 19.5 % (ref 19.6–45.3)
MCH RBC QN AUTO: 30.9 PG (ref 26.6–33)
MCHC RBC AUTO-ENTMCNC: 33.8 G/DL (ref 31.5–35.7)
MCV RBC AUTO: 91.4 FL (ref 79–97)
MONOCYTES # BLD AUTO: 0.59 10*3/MM3 (ref 0.1–0.9)
MONOCYTES NFR BLD AUTO: 6.9 % (ref 5–12)
NEUTROPHILS NFR BLD AUTO: 6.14 10*3/MM3 (ref 1.7–7)
NEUTROPHILS NFR BLD AUTO: 72.1 % (ref 42.7–76)
NITRITE UR QL STRIP: NEGATIVE
NRBC BLD AUTO-RTO: 0 /100 WBC (ref 0–0.2)
PH UR STRIP.AUTO: 7 [PH] (ref 5–8)
PLATELET # BLD AUTO: 212 10*3/MM3 (ref 140–450)
PMV BLD AUTO: 10.3 FL (ref 6–12)
POTASSIUM SERPL-SCNC: 3.6 MMOL/L (ref 3.5–5.2)
PROT SERPL-MCNC: 6.8 G/DL (ref 6–8.5)
PROT UR QL STRIP: NEGATIVE
RBC # BLD AUTO: 4.43 10*6/MM3 (ref 3.77–5.28)
RH BLD: NEGATIVE
SODIUM SERPL-SCNC: 139 MMOL/L (ref 136–145)
SP GR UR STRIP: >=1.03 (ref 1–1.03)
T VAGINALIS SPEC QL WET PREP: ABNORMAL
UROBILINOGEN UR QL STRIP: ABNORMAL
WBC # BLD AUTO: 8.51 10*3/MM3 (ref 3.4–10.8)
WBC SPEC QL WET PREP: ABNORMAL
YEAST GENITAL QL WET PREP: ABNORMAL

## 2021-02-02 PROCEDURE — 99284 EMERGENCY DEPT VISIT MOD MDM: CPT

## 2021-02-02 PROCEDURE — 87591 N.GONORRHOEAE DNA AMP PROB: CPT | Performed by: NURSE PRACTITIONER

## 2021-02-02 PROCEDURE — 87491 CHLMYD TRACH DNA AMP PROBE: CPT | Performed by: NURSE PRACTITIONER

## 2021-02-02 PROCEDURE — 81003 URINALYSIS AUTO W/O SCOPE: CPT | Performed by: NURSE PRACTITIONER

## 2021-02-02 PROCEDURE — 84703 CHORIONIC GONADOTROPIN ASSAY: CPT | Performed by: NURSE PRACTITIONER

## 2021-02-02 PROCEDURE — P9612 CATHETERIZE FOR URINE SPEC: HCPCS

## 2021-02-02 PROCEDURE — 86901 BLOOD TYPING SEROLOGIC RH(D): CPT | Performed by: NURSE PRACTITIONER

## 2021-02-02 PROCEDURE — 84702 CHORIONIC GONADOTROPIN TEST: CPT | Performed by: NURSE PRACTITIONER

## 2021-02-02 PROCEDURE — 85025 COMPLETE CBC W/AUTO DIFF WBC: CPT | Performed by: NURSE PRACTITIONER

## 2021-02-02 PROCEDURE — 87210 SMEAR WET MOUNT SALINE/INK: CPT | Performed by: NURSE PRACTITIONER

## 2021-02-02 PROCEDURE — 80053 COMPREHEN METABOLIC PANEL: CPT | Performed by: NURSE PRACTITIONER

## 2021-02-02 PROCEDURE — 86900 BLOOD TYPING SEROLOGIC ABO: CPT | Performed by: NURSE PRACTITIONER

## 2021-02-02 RX ORDER — SERTRALINE HYDROCHLORIDE 100 MG/1
100 TABLET, FILM COATED ORAL DAILY
COMMUNITY
End: 2023-02-04

## 2021-02-02 RX ORDER — SODIUM CHLORIDE 0.9 % (FLUSH) 0.9 %
10 SYRINGE (ML) INJECTION AS NEEDED
Status: DISCONTINUED | OUTPATIENT
Start: 2021-02-02 | End: 2021-02-02 | Stop reason: HOSPADM

## 2021-02-02 NOTE — DISCHARGE INSTRUCTIONS
Please talk to Dr. Peterson about getting on some birth control    Although you are being discharged from the ED today, I encourage you to return for worsening symptoms. Things can, and do, change such that treatment at home with medication may not be adequate. Specifically I recommend returning for chest pain or discomfort, difficulty breathing, persistent vomiting or difficulty holding down liquids or medications, fever > 102.0 F, or any other worsening or alarming symptoms.     Rest. Drink plenty of fluids.  Follow up with PCP or provider listed for further evaluation and management.  Follow up with primary care provider for further management and to have blood pressure rechecked.  Take all medications as prescribed.

## 2021-02-02 NOTE — ED NOTES
Patient was placed in face mask in first look. Patient was wearing facemask when I entered the room and throughout our encounter. I wore full protective equipment throughout this patient encounter including a face mask, eye protection, and gloves. Hand hygiene was performed before donning protective equipment and after removal when leaving the room.       Agatha Hernandez RN  02/02/21 [id number]

## 2021-02-02 NOTE — ED NOTES
Patient notes vaginal bleeding that started at 1300 today, notes she saturated 1 pad in 1 hour with 2-3 dime sized clots.  Notes mild dizziness with no falls.  Patient is concerned she has had a miscarriage, notes last period was on 01/23/2021, has not had a confirmed pregnancy test.  Called OB and told to come into ED for further evaluation. Complains of intermittent abdominal cramping and nausea, denies any at this time.  Patient ambulated to room from triage.  Boyfriend at bedside.  ABC's intact.  Patient instructed to undress for provider examination.  Patient wearing mask, nurse wearing mask and protective eyewear for assessment and care, hand hygiene performed pre and post care.      Eddie Justice RN  02/02/21 1947       Eddie Justice RN  02/02/21 0282

## 2021-02-02 NOTE — ED PROVIDER NOTES
Pt presents to the ED c/o  acute onset of generalized lower abdominal cramping and vaginal bleeding which started around 1300 today.  She did note some clots.  Last menstrual cycle was January 23 of 2021.  Currently denies any significant pain.  Pain was not focal, described as generalized and crampy.  No syncope.     On exam,   General: Awake, alert, no acute distress  HEENT: EOMI  Pulm: Symmetric chest rise, nonlabored breathing  CV: Regular rate and rhythm  GI: Non-distended, nontender abdomen  MSK: No deformity  Skin: Warm, dry  Neuro: Alert and oriented x 3, moving all extremities, no focal deficits  Psych: Calm, cooperative    Vital signs and nursing notes reviewed.       Surgical mask, protective eye goggles, and gloves used during this encounter. Patient in surgical mask.      Plan:   ED Course as of Feb 02 1732   Tue Feb 02, 2021   1719 MDM/dispo:  HCG is negative and Quant is very low.  She has no adnexal tenderness.  Exam does not seem suspicious for ectopic pregnancy nor would we see anything on ultrasound.  She is established with Dr. Peterson and I recommend she see him in 2-3 days for follow up.      [EW]   1721 I updated patient on labs, negative pregnancy and plan for follow up with Dr. Peterson.  She has no questions and does not need a work note. She appears comfortable and not in any distress.     [EW]      ED Course User Index  [EW] Lorna Man APRN     Patient with a negative pregnancy test and I think unlikely to have ectopic pregnancy without any significant abdominal tenderness at this time.  Patient reports a history of a ruptured ovarian cyst in the past, this may certainly be a contributing factor today.  Overall she is very well-appearing and pain-free.  Advised strict return to the emergency department for worsening pains, OB follow-up as needed, ED return for worsening symptoms as discussed.     Attestation:  The CRICKET and I have discussed this patient's history, physical exam, and  treatment plan.  I have reviewed the documentation and personally had a face to face interaction with the patient. I affirm the documentation and agree with the treatment and plan.  The attached note describes my personal findings.            Eldon Skaggs MD  02/02/21 7584

## 2021-02-02 NOTE — ED PROVIDER NOTES
EMERGENCY DEPARTMENT ENCOUNTER    Room Number:    Date seen:  2021  Time seen: 16:05 EST  PCP: Angie Morton APRN  Historian: patient    HPI:  Chief complaint:vaginal bleeding and cramping  A complete HPI/ROS/PMH/PSH/SH/FH are unobtainable due to: n/a  Context:Nannette Godinez is a 20 y.o. female known [id number] presents to the ED with c/o acute onset of general lower abdominal cramping and vaginal bleeding which started at 1300 today described as moderate.  She states the cramping is much less and she did take some Tylenol at home.  It is not made worse by anything.  She states she saturated one darwin pad.  Patient states that her last menstrual period was 2021.  She states that did not seem like a normal period and she also states that her period prior to that she is unsure of but thinks November.  She did not have a period in December but she states that is not abnormal for her.  She denies any concern for STIs.  She is not on birth control.    Patient was placed in face mask in first look. Patient was wearing facemask when I entered the room and throughout our encounter. I wore full protective equipment throughout this patient encounter including a face mask, eye shield and gloves. Hand hygiene/washing of hands was performed before donning protective equipment and after removal when leaving the room.    MEDICAL RECORD REVIEW    ALLERGIES  Patient has no known allergies.    PAST MEDICAL HISTORY  Active Ambulatory Problems     Diagnosis Date Noted   • Primigravida 2019   • Depression, major, recurrent (CMS/[facility]) 2013   • History of  premature rupture of membranes (PPROM) 2019     Resolved Ambulatory Problems     Diagnosis Date Noted   • Pregnancy 2019   •  labor 2019   •  premature rupture of membranes (PPROM) with unknown onset of labor 2019     Past Medical History:   Diagnosis Date   • Deliberate self-cutting        PAST SURGICAL  HISTORY  Past Surgical History:   Procedure Laterality Date   • WISDOM TOOTH EXTRACTION         FAMILY HISTORY  Family History   Problem Relation Age of Onset   • Hypertension Paternal Uncle        SOCIAL HISTORY  Social History     Socioeconomic History   • Marital status: Single     Spouse name: Not on file   • Number of children: Not on file   • Years of education: Not on file   • Highest education level: Not on file   Tobacco Use   • Smoking status: Current Every Day Smoker     Packs/day: 0.00     Types: Cigarettes   • Smokeless tobacco: Never Used   Substance and Sexual Activity   • Alcohol use: No     Frequency: Never   • Drug use: No   • Sexual activity: Yes     Partners: Male       REVIEW OF SYSTEMS  Review of Systems    All systems reviewed and negative except for those discussed in HPI.     PHYSICAL EXAM    ED Triage Vitals [02/02/21 1558]   Temp Heart Rate Resp BP SpO2   98.7 °F (37.1 °C) 93 20 -- 98 %      Temp src Heart Rate Source Patient Position BP Location FiO2 (%)   Tympanic Monitor -- -- --     Physical Exam    I have reviewed the triage vital signs and nursing notes.      GENERAL: not distressed  HENT: nares patent  EYES: no scleral icterus  NECK: no ROM limitations  CV: regular rhythm, regular rate, no murmur, no rubs, no gallups  RESPIRATORY: normal effort, CTAB  ABDOMEN: soft, flat, no focal tenderness or guarding  : Pelvic exam performed with female RN as chaperone.  No adnexal tenderness, cervix is closed, no CMT.  Moderate vaginal bleeding noted in vaginal vault  MUSCULOSKELETAL: no deformity  NEURO: alert, moves all extremities, follows commands  SKIN: warm, dry    LAB RESULTS  Recent Results (from the past 24 hour(s))   Urinalysis With Culture If Indicated - Urine, Catheter    Collection Time: 02/02/21  4:17 PM    Specimen: Urine, Catheter   Result Value Ref Range    Color, UA Dark Yellow (A) Yellow, Straw    Appearance, UA Clear Clear    pH, UA 7.0 5.0 - 8.0    Specific Gravity, UA  >=1.030 1.005 - 1.030    Glucose, UA Negative Negative    Ketones, UA Trace (A) Negative    Bilirubin, UA Small (1+) (A) Negative    Blood, UA Negative Negative    Protein, UA Negative Negative    Leuk Esterase, UA Negative Negative    Nitrite, UA Negative Negative    Urobilinogen, UA 1.0 E.U./dL 0.2 - 1.0 E.U./dL   Comprehensive Metabolic Panel    Collection Time: 02/02/21  4:28 PM    Specimen: Blood   Result Value Ref Range    Glucose 104 (H) 65 - 99 mg/dL    BUN 10 6 - 20 mg/dL    Creatinine 0.67 0.57 - 1.00 mg/dL    Sodium 139 136 - 145 mmol/L    Potassium 3.6 3.5 - 5.2 mmol/L    Chloride 102 98 - 107 mmol/L    CO2 26.2 22.0 - 29.0 mmol/L    Calcium 9.6 8.6 - 10.5 mg/dL    Total Protein 6.8 6.0 - 8.5 g/dL    Albumin 4.50 3.50 - 5.20 g/dL    ALT (SGPT) 7 1 - 33 U/L    AST (SGOT) 15 1 - 32 U/L    Alkaline Phosphatase 54 39 - 117 U/L    Total Bilirubin 0.5 0.0 - 1.2 mg/dL    eGFR Non African Amer 111 >60 mL/min/1.73    Globulin 2.3 gm/dL    A/G Ratio 2.0 g/dL    BUN/Creatinine Ratio 14.9 7.0 - 25.0    Anion Gap 10.8 5.0 - 15.0 mmol/L   hCG, Quantitative, Pregnancy    Collection Time: 02/02/21  4:28 PM    Specimen: Blood   Result Value Ref Range    HCG Quantitative <0.50 mIU/mL   ABO / Rh    Collection Time: 02/02/21  4:28 PM    Specimen: Blood   Result Value Ref Range    ABO Type O     RH type Negative    CBC Auto Differential    Collection Time: 02/02/21  4:28 PM    Specimen: Blood   Result Value Ref Range    WBC 8.51 3.40 - 10.80 10*3/mm3    RBC 4.43 3.77 - 5.28 10*6/mm3    Hemoglobin 13.7 12.0 - 15.9 g/dL    Hematocrit 40.5 34.0 - 46.6 %    MCV 91.4 79.0 - 97.0 fL    MCH 30.9 26.6 - 33.0 pg    MCHC 33.8 31.5 - 35.7 g/dL    RDW 11.9 (L) 12.3 - 15.4 %    RDW-SD 39.5 37.0 - 54.0 fl    MPV 10.3 6.0 - 12.0 fL    Platelets 212 140 - 450 10*3/mm3    Neutrophil % 72.1 42.7 - 76.0 %    Lymphocyte % 19.5 (L) 19.6 - 45.3 %    Monocyte % 6.9 5.0 - 12.0 %    Eosinophil % 0.6 0.3 - 6.2 %    Basophil % 0.4 0.0 - 1.5 %     Immature Grans % 0.5 0.0 - 0.5 %    Neutrophils, Absolute 6.14 1.70 - 7.00 10*3/mm3    Lymphocytes, Absolute 1.66 0.70 - 3.10 10*3/mm3    Monocytes, Absolute 0.59 0.10 - 0.90 10*3/mm3    Eosinophils, Absolute 0.05 0.00 - 0.40 10*3/mm3    Basophils, Absolute 0.03 0.00 - 0.20 10*3/mm3    Immature Grans, Absolute 0.04 0.00 - 0.05 10*3/mm3    nRBC 0.0 0.0 - 0.2 /100 WBC   hCG, Serum, Qualitative    Collection Time: 02/02/21  4:28 PM    Specimen: Blood   Result Value Ref Range    HCG Qualitative Negative Negative   Wet Prep, Genital - Swab, Vagina    Collection Time: 02/02/21  4:33 PM    Specimen: Vagina; Swab   Result Value Ref Range    YEAST No yeast seen No yeast seen    HYPHAL ELEMENTS No Hyphal elements seen No Hyphal elements seen    WBC'S 2+ WBC's seen (A) No WBC's seen    Clue Cells, Wet Prep No Clue cells seen No Clue cells seen    Trichomonas, Wet Prep No Trichomonas seen No Trichomonas seen         RADIOLOGY RESULTS  No orders to display         PROGRESS, DATA ANALYSIS, CONSULTS AND MEDICAL DECISION MAKING  All labs have been independently reviewed by me.  All radiology studies have been reviewed by me and discussed with radiologist dictating the report.  EKG's independently viewed and interpreted by me unless stated otherwise. Discussion below represents my analysis of pertinent findings related to patient's condition, differential diagnosis, treatment plan and final disposition.     ED Course as of Feb 02 1724   Tue Feb 02, 2021   1719 MDM/dispo:  HCG is negative and Quant is very low.  She has no adnexal tenderness.  Exam does not seem suspicious for ectopic pregnancy nor would we see anything on ultrasound.  She is established with Dr. Peterson and I recommend she see him in 2-3 days for follow up.      [EW]   1721 I updated patient on labs, negative pregnancy and plan for follow up with Dr. Peterson.  She has no questions and does not need a work note. She appears comfortable and not in any distress.     [EW]  "     ED Course User Index  [EW] Lorna Man, APRN     DDX: - TOA  - ovarian torsion  - PID or cervicitis  - ovarian cyst, including hemorrhagic  - ectopic pregnancy  - pregnancy  - endometriosis  - fibroids    MDM:  See above in ED course     Reviewed pt's history and workup with Dr. Skaggs.  After a bedside evaluation, Dr. Skaggs agrees with the plan of care.    The patient's history, physical exam,and lab findings were discussed with the physician, who also performed a face to face history and physical exam.  I discussed all results and noted any abnormalities with patient.  Discussed absoute need to recheck abnormalities with their family physician.  I answered any of the patient's questions.  Discussed plan for discharge, as there is no emergent indication for admission.  Pt is agreeable and understands need for follow up and repeat testing.  Pt is aware that discharge does not mean that nothing is wrong but it indicates no emergency is present and they must continue care with their family physician.  Pt is discharged with instructions to follow up with primary care doctor to have their blood pressure rechecked.         Disposition vitals:  /70   Pulse 66   Temp 98.7 °F (37.1 °C) (Tympanic)   Resp 18   Ht 170.2 cm (67\")   Wt 49.9 kg (110 lb)   LMP 01/23/2021   SpO2 99%   BMI 17.23 kg/m²       DIAGNOSIS  Final diagnoses:   Episode of heavy vaginal bleeding   Abdominal cramping   Pregnancy test negative       FOLLOW UP   Cezar Peterson MD  [address] [facility] [address]  [place] [address]  [phone]    Schedule an appointment as soon as possible for a visit in 3 days  As needed         Lorna Man APRN  02/02/21 1725    "

## 2021-02-02 NOTE — ED NOTES
Pt arrives from home with c/o vaginal bleeding and abd cramping starting at 1300. Pt thinks she is having a miscarriage but states she's unaware of how far along she might be. Pt a&ox4, abc's intact, NAD noted, ambulatory to triage.    Patient wearing mask during triage. RN wearing appropraite PPE during triage. Hand hygiene performed.       Carla Villarreal, RN  02/02/21 6653

## 2021-02-03 ENCOUNTER — TELEPHONE (OUTPATIENT)
Dept: OBSTETRICS AND GYNECOLOGY | Facility: CLINIC | Age: 21
End: 2021-02-03

## 2021-02-03 DIAGNOSIS — R10.2 PELVIC PAIN: Primary | ICD-10-CM

## 2021-02-03 LAB
C TRACH RRNA SPEC QL NAA+PROBE: NEGATIVE
N GONORRHOEA RRNA SPEC QL NAA+PROBE: NEGATIVE

## 2021-02-03 NOTE — TELEPHONE ENCOUNTER
Patient called and was seen in ER thought she was having a miscarriage or ectopic pregnancy and the ER doctor said to follow up with Dr. Peterson.   Please Advise,  Thanks Rufina

## 2021-02-03 NOTE — TELEPHONE ENCOUNTER
Patient had 3 hours of intense pain and was seen in the emergency room yesterday with what she thought might have been a miscarriage or ectopic.  Her hCG then was less than 0.5 which is consistent with no pregnancy issue.  Her pain is resolved now but she would like to do an ultrasound just to see if she might of had a ruptured ovarian cyst.  She will call tomorrow to schedule it tomorrow or Friday.  PRINCE

## 2021-04-16 ENCOUNTER — BULK ORDERING (OUTPATIENT)
Dept: CASE MANAGEMENT | Facility: OTHER | Age: 21
End: 2021-04-16

## 2021-04-16 DIAGNOSIS — Z23 IMMUNIZATION DUE: ICD-10-CM

## 2022-01-03 LAB
ALBUMIN SERPL-MCNC: 4.3 G/DL (ref 3.5–5.2)
ALBUMIN/GLOB SERPL: 1.5 G/DL
ALP SERPL-CCNC: 60 U/L (ref 39–117)
ALT SERPL W P-5'-P-CCNC: 9 U/L (ref 1–33)
ANION GAP SERPL CALCULATED.3IONS-SCNC: 9.9 MMOL/L (ref 5–15)
AST SERPL-CCNC: 15 U/L (ref 1–32)
BACTERIA UR QL AUTO: ABNORMAL /HPF
BASOPHILS # BLD AUTO: 0.02 10*3/MM3 (ref 0–0.2)
BASOPHILS NFR BLD AUTO: 0.5 % (ref 0–1.5)
BILIRUB SERPL-MCNC: 0.4 MG/DL (ref 0–1.2)
BILIRUB UR QL STRIP: NEGATIVE
BUN SERPL-MCNC: 7 MG/DL (ref 6–20)
BUN/CREAT SERPL: 8.9 (ref 7–25)
CALCIUM SPEC-SCNC: 8.9 MG/DL (ref 8.6–10.5)
CHLORIDE SERPL-SCNC: 103 MMOL/L (ref 98–107)
CLARITY UR: CLEAR
CO2 SERPL-SCNC: 26.1 MMOL/L (ref 22–29)
COARSE GRAN CASTS URNS QL MICRO: ABNORMAL
COLOR UR: ABNORMAL
CREAT SERPL-MCNC: 0.79 MG/DL (ref 0.57–1)
DEPRECATED RDW RBC AUTO: 38.3 FL (ref 37–54)
EOSINOPHIL # BLD AUTO: 0.03 10*3/MM3 (ref 0–0.4)
EOSINOPHIL NFR BLD AUTO: 0.8 % (ref 0.3–6.2)
ERYTHROCYTE [DISTWIDTH] IN BLOOD BY AUTOMATED COUNT: 11.8 % (ref 12.3–15.4)
GFR SERPL CREATININE-BSD FRML MDRD: 91 ML/MIN/1.73
GLOBULIN UR ELPH-MCNC: 2.8 GM/DL
GLUCOSE SERPL-MCNC: 101 MG/DL (ref 65–99)
GLUCOSE UR STRIP-MCNC: NEGATIVE MG/DL
HCG SERPL QL: NEGATIVE
HCT VFR BLD AUTO: 38.9 % (ref 34–46.6)
HGB BLD-MCNC: 13.3 G/DL (ref 12–15.9)
HGB UR QL STRIP.AUTO: ABNORMAL
HOLD SPECIMEN: NORMAL
HYALINE CASTS UR QL AUTO: ABNORMAL /LPF
IMM GRANULOCYTES # BLD AUTO: 0.01 10*3/MM3 (ref 0–0.05)
IMM GRANULOCYTES NFR BLD AUTO: 0.3 % (ref 0–0.5)
KETONES UR QL STRIP: ABNORMAL
LEUKOCYTE ESTERASE UR QL STRIP.AUTO: ABNORMAL
LIPASE SERPL-CCNC: 32 U/L (ref 13–60)
LYMPHOCYTES # BLD AUTO: 0.71 10*3/MM3 (ref 0.7–3.1)
LYMPHOCYTES NFR BLD AUTO: 18.7 % (ref 19.6–45.3)
MCH RBC QN AUTO: 31 PG (ref 26.6–33)
MCHC RBC AUTO-ENTMCNC: 34.2 G/DL (ref 31.5–35.7)
MCV RBC AUTO: 90.7 FL (ref 79–97)
MONOCYTES # BLD AUTO: 0.57 10*3/MM3 (ref 0.1–0.9)
MONOCYTES NFR BLD AUTO: 15 % (ref 5–12)
MUCOUS THREADS URNS QL MICRO: ABNORMAL /HPF
NEUTROPHILS NFR BLD AUTO: 2.46 10*3/MM3 (ref 1.7–7)
NEUTROPHILS NFR BLD AUTO: 64.7 % (ref 42.7–76)
NITRITE UR QL STRIP: NEGATIVE
NRBC BLD AUTO-RTO: 0 /100 WBC (ref 0–0.2)
PH UR STRIP.AUTO: 6.5 [PH] (ref 5–8)
PLATELET # BLD AUTO: 211 10*3/MM3 (ref 140–450)
PMV BLD AUTO: 10.2 FL (ref 6–12)
POTASSIUM SERPL-SCNC: 3.3 MMOL/L (ref 3.5–5.2)
PROT SERPL-MCNC: 7.1 G/DL (ref 6–8.5)
PROT UR QL STRIP: ABNORMAL
RBC # BLD AUTO: 4.29 10*6/MM3 (ref 3.77–5.28)
RBC # UR STRIP: ABNORMAL /HPF
REF LAB TEST METHOD: ABNORMAL
SODIUM SERPL-SCNC: 139 MMOL/L (ref 136–145)
SP GR UR STRIP: >=1.03 (ref 1–1.03)
SQUAMOUS #/AREA URNS HPF: ABNORMAL /HPF
UROBILINOGEN UR QL STRIP: ABNORMAL
WBC # UR STRIP: ABNORMAL /HPF
WBC NRBC COR # BLD: 3.8 10*3/MM3 (ref 3.4–10.8)
WHOLE BLOOD HOLD SPECIMEN: NORMAL
WHOLE BLOOD HOLD SPECIMEN: NORMAL

## 2022-01-03 PROCEDURE — 81001 URINALYSIS AUTO W/SCOPE: CPT

## 2022-01-03 PROCEDURE — 36415 COLL VENOUS BLD VENIPUNCTURE: CPT

## 2022-01-03 PROCEDURE — 99283 EMERGENCY DEPT VISIT LOW MDM: CPT

## 2022-01-03 PROCEDURE — 83690 ASSAY OF LIPASE: CPT

## 2022-01-03 PROCEDURE — 84703 CHORIONIC GONADOTROPIN ASSAY: CPT

## 2022-01-03 PROCEDURE — 80053 COMPREHEN METABOLIC PANEL: CPT

## 2022-01-03 PROCEDURE — 85025 COMPLETE CBC W/AUTO DIFF WBC: CPT

## 2022-01-03 RX ORDER — SODIUM CHLORIDE 0.9 % (FLUSH) 0.9 %
10 SYRINGE (ML) INJECTION AS NEEDED
Status: DISCONTINUED | OUTPATIENT
Start: 2022-01-03 | End: 2022-01-04 | Stop reason: HOSPADM

## 2022-01-03 NOTE — ED NOTES
Patient states she had UTI dx two weeks ago and did not take prescribed ABX as directed and is starting to have lower back pain, chills and night sweats. Hx of kidney infections.      Nannette Weldon RN  01/03/22 7065

## 2022-01-04 ENCOUNTER — HOSPITAL ENCOUNTER (EMERGENCY)
Facility: HOSPITAL | Age: 22
Discharge: HOME | End: 2022-01-04
Attending: EMERGENCY MEDICINE | Admitting: EMERGENCY MEDICINE

## 2022-01-04 VITALS
DIASTOLIC BLOOD PRESSURE: 91 MMHG | WEIGHT: 110 LBS | OXYGEN SATURATION: 95 % | HEIGHT: 67 IN | BODY MASS INDEX: 17.27 KG/M2 | HEART RATE: 66 BPM | RESPIRATION RATE: 18 BRPM | TEMPERATURE: 99.9 F | SYSTOLIC BLOOD PRESSURE: 133 MMHG

## 2022-01-04 DIAGNOSIS — N39.0 URINARY TRACT INFECTION WITHOUT HEMATURIA, SITE UNSPECIFIED: ICD-10-CM

## 2022-01-04 DIAGNOSIS — U07.1 COVID-19: Primary | ICD-10-CM

## 2022-01-04 LAB — SARS-COV-2 RNA RESP QL NAA+PROBE: DETECTED

## 2022-01-04 PROCEDURE — U0003 INFECTIOUS AGENT DETECTION BY NUCLEIC ACID (DNA OR RNA); SEVERE ACUTE RESPIRATORY SYNDROME CORONAVIRUS 2 (SARS-COV-2) (CORONAVIRUS DISEASE [COVID-19]), AMPLIFIED PROBE TECHNIQUE, MAKING USE OF HIGH THROUGHPUT TECHNOLOGIES AS DESCRIBED BY CMS-2020-01-R: HCPCS | Performed by: EMERGENCY MEDICINE

## 2022-01-04 PROCEDURE — 25010000002 CEFTRIAXONE PER 250 MG: Performed by: EMERGENCY MEDICINE

## 2022-01-04 PROCEDURE — 96365 THER/PROPH/DIAG IV INF INIT: CPT

## 2022-01-04 RX ORDER — SODIUM CHLORIDE 0.9 % (FLUSH) 0.9 %
10 SYRINGE (ML) INJECTION AS NEEDED
Status: DISCONTINUED | OUTPATIENT
Start: 2022-01-04 | End: 2022-01-04 | Stop reason: HOSPADM

## 2022-01-04 RX ORDER — ACETAMINOPHEN 500 MG
1000 TABLET ORAL ONCE
Status: COMPLETED | OUTPATIENT
Start: 2022-01-04 | End: 2022-01-04

## 2022-01-04 RX ORDER — ONDANSETRON 4 MG/1
4 TABLET, ORALLY DISINTEGRATING ORAL EVERY 8 HOURS PRN
Qty: 12 TABLET | Refills: 0 | Status: SHIPPED | OUTPATIENT
Start: 2022-01-04 | End: 2023-02-04

## 2022-01-04 RX ORDER — CEPHALEXIN 500 MG/1
500 CAPSULE ORAL 2 TIMES DAILY
Qty: 10 CAPSULE | Refills: 0 | Status: SHIPPED | OUTPATIENT
Start: 2022-01-04 | End: 2022-01-09

## 2022-01-04 RX ADMIN — SODIUM CHLORIDE 1 G: 9 INJECTION, SOLUTION INTRAVENOUS at 02:25

## 2022-01-04 RX ADMIN — SODIUM CHLORIDE 1000 ML: 9 INJECTION, SOLUTION INTRAVENOUS at 02:22

## 2022-01-04 RX ADMIN — ACETAMINOPHEN 1000 MG: 500 TABLET ORAL at 02:30

## 2022-01-04 NOTE — ED NOTES
Pt reassessed reports increased rt flank pain. Denies difficulty breathing. Pt remains afebrile, pt is tachycardic at 116.        Epi King, RN  01/04/22 0117

## 2022-01-04 NOTE — ED PROVIDER NOTES
EMERGENCY DEPARTMENT ENCOUNTER    Room Number:    Date of encounter:  2022  PCP: Angie Morton APRN  Historian: Patient      HPI:  Chief Complaint: Back pain chills dysuria  A complete HPI/ROS/PMH/PSH/SH/FH are unobtainable due to: None    Context: Nannette Godinez is a 21 y.o. female who presents to the ED c/o back pain chills and dysuria for the past couple days.  Patient states that 2 weeks ago she was started on antibiotics for UTI.  States that she did not take them regularly and stopped taking them early.  Is concerned that she may have a worsening urinary tract infection.  States in the past she has had a kidney infection.  Patient denies any cough congestion abdominal pain chest pain.      PAST MEDICAL HISTORY  Active Ambulatory Problems     Diagnosis Date Noted   • Primigravida 2019   • Depression, major, recurrent (HCC) 2013   • History of  premature rupture of membranes (PPROM) 2019     Resolved Ambulatory Problems     Diagnosis Date Noted   • Pregnancy 2019   •  labor 2019   •  premature rupture of membranes (PPROM) with unknown onset of labor 2019     Past Medical History:   Diagnosis Date   • Deliberate self-cutting          PAST SURGICAL HISTORY  Past Surgical History:   Procedure Laterality Date   • WISDOM TOOTH EXTRACTION           FAMILY HISTORY  Family History   Problem Relation Age of Onset   • Hypertension Paternal Uncle          SOCIAL HISTORY  Social History     Socioeconomic History   • Marital status: Single   Tobacco Use   • Smoking status: Current Every Day Smoker     Packs/day: 0.00     Types: Cigarettes   • Smokeless tobacco: Never Used   Substance and Sexual Activity   • Alcohol use: No   • Drug use: No   • Sexual activity: Yes     Partners: Male         ALLERGIES  Patient has no known allergies.        REVIEW OF SYSTEMS  Review of Systems     All systems reviewed and negative except for those discussed in HPI.        PHYSICAL EXAM    I have reviewed the triage vital signs and nursing notes.    ED Triage Vitals   Temp Heart Rate Resp BP SpO2   01/03/22 1746 01/03/22 1746 01/03/22 1746 01/03/22 1747 01/03/22 1746   99.7 °F (37.6 °C) 119 18 139/97 100 %      Temp src Heart Rate Source Patient Position BP Location FiO2 (%)   01/03/22 1746 01/03/22 2238 01/03/22 2238 01/04/22 0116 --   Tympanic Monitor Sitting Left arm        Physical Exam  GENERAL: not distressed  HENT: nares patent  EYES: no scleral icterus  CV: regular rhythm, regular rate  RESPIRATORY: normal effort  ABDOMEN: soft, nontender nondistended  MUSCULOSKELETAL: no deformity, mild right-sided low back pain no true CVA tenderness  NEURO: alert, moves all extremities, follows commands  SKIN: warm, dry        LAB RESULTS  Recent Results (from the past 24 hour(s))   Comprehensive Metabolic Panel    Collection Time: 01/03/22  6:56 PM    Specimen: Blood   Result Value Ref Range    Glucose 101 (H) 65 - 99 mg/dL    BUN 7 6 - 20 mg/dL    Creatinine 0.79 0.57 - 1.00 mg/dL    Sodium 139 136 - 145 mmol/L    Potassium 3.3 (L) 3.5 - 5.2 mmol/L    Chloride 103 98 - 107 mmol/L    CO2 26.1 22.0 - 29.0 mmol/L    Calcium 8.9 8.6 - 10.5 mg/dL    Total Protein 7.1 6.0 - 8.5 g/dL    Albumin 4.30 3.50 - 5.20 g/dL    ALT (SGPT) 9 1 - 33 U/L    AST (SGOT) 15 1 - 32 U/L    Alkaline Phosphatase 60 39 - 117 U/L    Total Bilirubin 0.4 0.0 - 1.2 mg/dL    eGFR Non African Amer 91 >60 mL/min/1.73    Globulin 2.8 gm/dL    A/G Ratio 1.5 g/dL    BUN/Creatinine Ratio 8.9 7.0 - 25.0    Anion Gap 9.9 5.0 - 15.0 mmol/L   Lipase    Collection Time: 01/03/22  6:56 PM    Specimen: Blood   Result Value Ref Range    Lipase 32 13 - 60 U/L   hCG, Serum, Qualitative    Collection Time: 01/03/22  6:56 PM    Specimen: Blood   Result Value Ref Range    HCG Qualitative Negative Negative   Green Top (Gel)    Collection Time: 01/03/22  6:56 PM   Result Value Ref Range    Extra Tube Hold for add-ons.    Lavender Top     Collection Time: 01/03/22  6:56 PM   Result Value Ref Range    Extra Tube hold for add-on    Light Blue Top    Collection Time: 01/03/22  6:56 PM   Result Value Ref Range    Extra Tube hold for add-on    CBC Auto Differential    Collection Time: 01/03/22  6:56 PM    Specimen: Blood   Result Value Ref Range    WBC 3.80 3.40 - 10.80 10*3/mm3    RBC 4.29 3.77 - 5.28 10*6/mm3    Hemoglobin 13.3 12.0 - 15.9 g/dL    Hematocrit 38.9 34.0 - 46.6 %    MCV 90.7 79.0 - 97.0 fL    MCH 31.0 26.6 - 33.0 pg    MCHC 34.2 31.5 - 35.7 g/dL    RDW 11.8 (L) 12.3 - 15.4 %    RDW-SD 38.3 37.0 - 54.0 fl    MPV 10.2 6.0 - 12.0 fL    Platelets 211 140 - 450 10*3/mm3    Neutrophil % 64.7 42.7 - 76.0 %    Lymphocyte % 18.7 (L) 19.6 - 45.3 %    Monocyte % 15.0 (H) 5.0 - 12.0 %    Eosinophil % 0.8 0.3 - 6.2 %    Basophil % 0.5 0.0 - 1.5 %    Immature Grans % 0.3 0.0 - 0.5 %    Neutrophils, Absolute 2.46 1.70 - 7.00 10*3/mm3    Lymphocytes, Absolute 0.71 0.70 - 3.10 10*3/mm3    Monocytes, Absolute 0.57 0.10 - 0.90 10*3/mm3    Eosinophils, Absolute 0.03 0.00 - 0.40 10*3/mm3    Basophils, Absolute 0.02 0.00 - 0.20 10*3/mm3    Immature Grans, Absolute 0.01 0.00 - 0.05 10*3/mm3    nRBC 0.0 0.0 - 0.2 /100 WBC   Urinalysis With Microscopic If Indicated (No Culture) - Urine, Clean Catch    Collection Time: 01/03/22  7:21 PM    Specimen: Urine, Clean Catch   Result Value Ref Range    Color, UA Dark Yellow (A) Yellow, Straw    Appearance, UA Clear Clear    pH, UA 6.5 5.0 - 8.0    Specific Gravity, UA >=1.030 1.005 - 1.030    Glucose, UA Negative Negative    Ketones, UA Trace (A) Negative    Bilirubin, UA Negative Negative    Blood, UA Trace (A) Negative    Protein, UA 30 mg/dL (1+) (A) Negative    Leuk Esterase, UA Trace (A) Negative    Nitrite, UA Negative Negative    Urobilinogen, UA 1.0 E.U./dL 0.2 - 1.0 E.U./dL   Urinalysis, Microscopic Only - Urine, Clean Catch    Collection Time: 01/03/22  7:21 PM    Specimen: Urine, Clean Catch   Result Value  Ref Range    RBC, UA 6-12 (A) None Seen, 0-2 /HPF    WBC, UA 3-5 (A) None Seen, 0-2 /HPF    Bacteria, UA 1+ (A) None Seen /HPF    Squamous Epithelial Cells, UA 0-2 None Seen, 0-2 /HPF    Hyaline Casts, UA None Seen None Seen /LPF    Coarse Granular Casts, UA      Mucus, UA Moderate/2+ (A) None Seen, Trace /HPF    Methodology Manual Light Microscopy    COVID-19,[facility] PATY IN-HOUSE CEPHEID/MIAH NP SWAB IN TRANSPORT MEDIA 8-12 HR TAT - Swab, Nasopharynx    Collection Time: 01/04/22  2:28 AM    Specimen: Nasopharynx; Swab   Result Value Ref Range    COVID19 Detected (C) Not Detected - Ref. Range       Ordered the above labs and independently reviewed the results.        RADIOLOGY  No Radiology Exams Resulted Within Past 24 Hours    I ordered the above noted radiological studies. Reviewed by me and discussed with radiologist.  See dictation for official radiology interpretation.      PROCEDURES    Procedures      MEDICATIONS GIVEN IN ER    Medications   sodium chloride 0.9 % flush 10 mL (has no administration in time range)   sodium chloride 0.9 % flush 10 mL (has no administration in time range)   sodium chloride 0.9 % bolus 1,000 mL (0 mL Intravenous Stopped 1/4/22 0331)   cefTRIAXone (ROCEPHIN) 1 g in sodium chloride 0.9 % 100 mL IVPB-VTB (0 g Intravenous Stopped 1/4/22 0300)   acetaminophen (TYLENOL) tablet 1,000 mg (1,000 mg Oral Given 1/4/22 0230)         PROGRESS, DATA ANALYSIS, CONSULTS, AND MEDICAL DECISION MAKING    All labs have been independently reviewed by me.  All radiology studies have been reviewed by me and discussed with radiologist dictating the report.   EKG's independently viewed and interpreted by me.  Discussion below represents my analysis of pertinent findings related to patient's condition, differential diagnosis, treatment plan and final disposition.    I discussed Covid testing with the patient, given her symptoms, she is agreeable.             PPE: The patient wore a surgical mask throughout the  entire patient encounter. I wore an N95.    AS OF 06:49 EST VITALS:    BP - 133/91  HR - 66  TEMP - 99.9 °F (37.7 °C) (Oral)  O2 SATS - 95%        DIAGNOSIS  Final diagnoses:   Urinary tract infection without hematuria, site unspecified   COVID-19         DISPOSITION  Discharge           Nadir Cano MD  01/04/22 0622

## 2022-01-04 NOTE — ED NOTES
Patient was placed in face mask in first look. Patient was wearing facemask when I entered the room and throughout our encounter. I wore full protective equipment throughout this patient encounter including a face mask, and gloves. Hand hygiene was performed before donning protective equipment and after removal when leaving the room.     Sabra Hutton RN  01/04/22 0214

## 2022-04-20 ENCOUNTER — APPOINTMENT (OUTPATIENT)
Dept: ULTRASOUND IMAGING | Facility: HOSPITAL | Age: 22
End: 2022-04-20

## 2022-04-20 ENCOUNTER — HOSPITAL ENCOUNTER (EMERGENCY)
Facility: HOSPITAL | Age: 22
Discharge: HOME | End: 2022-04-21
Attending: EMERGENCY MEDICINE | Admitting: EMERGENCY MEDICINE

## 2022-04-20 DIAGNOSIS — R50.9 FEVER IN ADULT: Primary | ICD-10-CM

## 2022-04-20 DIAGNOSIS — R10.9 ABDOMINAL CRAMPING: ICD-10-CM

## 2022-04-20 DIAGNOSIS — Z34.91 FIRST TRIMESTER PREGNANCY: ICD-10-CM

## 2022-04-20 LAB
ALBUMIN SERPL-MCNC: 4.5 G/DL (ref 3.5–5.2)
ALBUMIN/GLOB SERPL: 1.9 G/DL
ALP SERPL-CCNC: 45 U/L (ref 39–117)
ALT SERPL W P-5'-P-CCNC: 12 U/L (ref 1–33)
ANION GAP SERPL CALCULATED.3IONS-SCNC: 11 MMOL/L (ref 5–15)
AST SERPL-CCNC: 15 U/L (ref 1–32)
BASOPHILS # BLD AUTO: 0.04 10*3/MM3 (ref 0–0.2)
BASOPHILS NFR BLD AUTO: 0.4 % (ref 0–1.5)
BILIRUB SERPL-MCNC: 0.4 MG/DL (ref 0–1.2)
BILIRUB UR QL STRIP: NEGATIVE
BUN SERPL-MCNC: 9 MG/DL (ref 6–20)
BUN/CREAT SERPL: 13.8 (ref 7–25)
CALCIUM SPEC-SCNC: 8.9 MG/DL (ref 8.6–10.5)
CHLORIDE SERPL-SCNC: 104 MMOL/L (ref 98–107)
CLARITY UR: CLEAR
CO2 SERPL-SCNC: 22 MMOL/L (ref 22–29)
COLOR UR: YELLOW
CREAT SERPL-MCNC: 0.65 MG/DL (ref 0.57–1)
DEPRECATED RDW RBC AUTO: 37.8 FL (ref 37–54)
EGFRCR SERPLBLD CKD-EPI 2021: 127.1 ML/MIN/1.73
EOSINOPHIL # BLD AUTO: 0.11 10*3/MM3 (ref 0–0.4)
EOSINOPHIL NFR BLD AUTO: 1.1 % (ref 0.3–6.2)
ERYTHROCYTE [DISTWIDTH] IN BLOOD BY AUTOMATED COUNT: 11.8 % (ref 12.3–15.4)
FLUAV SUBTYP SPEC NAA+PROBE: NOT DETECTED
FLUBV RNA ISLT QL NAA+PROBE: NOT DETECTED
GLOBULIN UR ELPH-MCNC: 2.4 GM/DL
GLUCOSE SERPL-MCNC: 82 MG/DL (ref 65–99)
GLUCOSE UR STRIP-MCNC: NEGATIVE MG/DL
HCT VFR BLD AUTO: 36.9 % (ref 34–46.6)
HGB BLD-MCNC: 12.6 G/DL (ref 12–15.9)
HGB UR QL STRIP.AUTO: NEGATIVE
IMM GRANULOCYTES # BLD AUTO: 0.03 10*3/MM3 (ref 0–0.05)
IMM GRANULOCYTES NFR BLD AUTO: 0.3 % (ref 0–0.5)
KETONES UR QL STRIP: NEGATIVE
LEUKOCYTE ESTERASE UR QL STRIP.AUTO: NEGATIVE
LIPASE SERPL-CCNC: 24 U/L (ref 13–60)
LYMPHOCYTES # BLD AUTO: 2.18 10*3/MM3 (ref 0.7–3.1)
LYMPHOCYTES NFR BLD AUTO: 22.8 % (ref 19.6–45.3)
MCH RBC QN AUTO: 30.5 PG (ref 26.6–33)
MCHC RBC AUTO-ENTMCNC: 34.1 G/DL (ref 31.5–35.7)
MCV RBC AUTO: 89.3 FL (ref 79–97)
MONOCYTES # BLD AUTO: 0.46 10*3/MM3 (ref 0.1–0.9)
MONOCYTES NFR BLD AUTO: 4.8 % (ref 5–12)
NEUTROPHILS NFR BLD AUTO: 6.76 10*3/MM3 (ref 1.7–7)
NEUTROPHILS NFR BLD AUTO: 70.6 % (ref 42.7–76)
NITRITE UR QL STRIP: NEGATIVE
NRBC BLD AUTO-RTO: 0 /100 WBC (ref 0–0.2)
PH UR STRIP.AUTO: 7.5 [PH] (ref 5–8)
PLATELET # BLD AUTO: 210 10*3/MM3 (ref 140–450)
PMV BLD AUTO: 10.1 FL (ref 6–12)
POTASSIUM SERPL-SCNC: 3.4 MMOL/L (ref 3.5–5.2)
PROT SERPL-MCNC: 6.9 G/DL (ref 6–8.5)
PROT UR QL STRIP: NEGATIVE
RBC # BLD AUTO: 4.13 10*6/MM3 (ref 3.77–5.28)
SARS-COV-2 RNA PNL SPEC NAA+PROBE: NOT DETECTED
SODIUM SERPL-SCNC: 137 MMOL/L (ref 136–145)
SP GR UR STRIP: 1.02 (ref 1–1.03)
UROBILINOGEN UR QL STRIP: NORMAL
WBC NRBC COR # BLD: 9.58 10*3/MM3 (ref 3.4–10.8)

## 2022-04-20 PROCEDURE — 99283 EMERGENCY DEPT VISIT LOW MDM: CPT

## 2022-04-20 PROCEDURE — 84702 CHORIONIC GONADOTROPIN TEST: CPT | Performed by: EMERGENCY MEDICINE

## 2022-04-20 PROCEDURE — 76817 TRANSVAGINAL US OBSTETRIC: CPT

## 2022-04-20 PROCEDURE — 85025 COMPLETE CBC W/AUTO DIFF WBC: CPT | Performed by: EMERGENCY MEDICINE

## 2022-04-20 PROCEDURE — 93976 VASCULAR STUDY: CPT

## 2022-04-20 PROCEDURE — 83690 ASSAY OF LIPASE: CPT | Performed by: EMERGENCY MEDICINE

## 2022-04-20 PROCEDURE — 76815 OB US LIMITED FETUS(S): CPT

## 2022-04-20 PROCEDURE — 87636 SARSCOV2 & INF A&B AMP PRB: CPT | Performed by: EMERGENCY MEDICINE

## 2022-04-20 PROCEDURE — 81003 URINALYSIS AUTO W/O SCOPE: CPT | Performed by: EMERGENCY MEDICINE

## 2022-04-20 PROCEDURE — 80053 COMPREHEN METABOLIC PANEL: CPT | Performed by: EMERGENCY MEDICINE

## 2022-04-20 RX ORDER — SODIUM CHLORIDE 0.9 % (FLUSH) 0.9 %
10 SYRINGE (ML) INJECTION AS NEEDED
Status: DISCONTINUED | OUTPATIENT
Start: 2022-04-20 | End: 2022-04-21 | Stop reason: HOSPADM

## 2022-04-20 RX ORDER — ACETAMINOPHEN 500 MG
1000 TABLET ORAL ONCE
Status: COMPLETED | OUTPATIENT
Start: 2022-04-20 | End: 2022-04-20

## 2022-04-20 RX ADMIN — SODIUM CHLORIDE 1000 ML: 9 INJECTION, SOLUTION INTRAVENOUS at 23:03

## 2022-04-20 RX ADMIN — ACETAMINOPHEN 1000 MG: 500 TABLET ORAL at 22:53

## 2022-04-21 VITALS
BODY MASS INDEX: 17.27 KG/M2 | DIASTOLIC BLOOD PRESSURE: 66 MMHG | HEART RATE: 77 BPM | TEMPERATURE: 100.3 F | RESPIRATION RATE: 16 BRPM | HEIGHT: 67 IN | WEIGHT: 110 LBS | SYSTOLIC BLOOD PRESSURE: 120 MMHG | OXYGEN SATURATION: 99 %

## 2022-04-21 LAB — HCG INTACT+B SERPL-ACNC: 1742 MIU/ML

## 2022-04-21 NOTE — ED TRIAGE NOTES
Pt ambulatory to ED triage and reports she is pregnant, unknown how far along, and has had a fever since 0300. Pt also reports abdominal cramps x 2 weeks. Denies bloody discharge. Pt reports she went to clinic yesterday and test was positive but nothing could be seen on US. Pt sees OB Penta.

## 2022-04-21 NOTE — ED PROVIDER NOTES
EMERGENCY DEPARTMENT ENCOUNTER    Room Number:    Date of encounter:  2022  PCP: Angie Morton APRN  Historian: Patient    Patient was placed in face mask during triage process. Patient was wearing facemask when I entered the room and throughout our encounter. I wore full protective equipment throughout this patient encounter including a face mask, eye protection, and gloves. Hand hygiene was performed before donning protective equipment and again following doffing of PPE after leaving the room.    HPI:  Chief Complaint: Fever, pelvic pain  A complete HPI/ROS/PMH/PSH/SH/FH are unobtainable due to: N/A   Context: Nannette Godinez is a 22 y.o. female  2 para 1 who presents to the ED c/o suprapubic cramping for approximately 2 weeks with new development of fever shortly prior to arrival to the ED this evening.  Patient unsure when her last normal menstrual period was as she is irregular at baseline.  Either 1 or 2 months ago she states.  She denies any dysuria or hematuria, change in bowel habits, vaginal bleeding or discharge.  No clear exacerbating or relieving factors.  Patient denies any ill contacts, cough, nausea, vomiting.  Patient reports that she went to an outpatient clinic to be checked for pregnancy recently and they did a transabdominal ultrasound that she felt was unreliable.  Discomfort in the abdomen was described as mild to moderate at this time.      MEDICAL HISTORY REVIEW  EMR reviewed:      PAST MEDICAL HISTORY  Active Ambulatory Problems     Diagnosis Date Noted   • Primigravida 2019   • Depression, major, recurrent (HCC) 2013   • History of  premature rupture of membranes (PPROM) 2019     Resolved Ambulatory Problems     Diagnosis Date Noted   • Pregnancy 2019   •  labor 2019   •  premature rupture of membranes (PPROM) with unknown onset of labor 2019     Past Medical History:   Diagnosis Date   • Deliberate self-cutting           PAST SURGICAL HISTORY  Past Surgical History:   Procedure Laterality Date   • WISDOM TOOTH EXTRACTION           FAMILY HISTORY  Family History   Problem Relation Age of Onset   • Hypertension Paternal Uncle          SOCIAL HISTORY  Social History     Socioeconomic History   • Marital status: Single   Tobacco Use   • Smoking status: Current Every Day Smoker     Packs/day: 0.00     Types: Cigarettes   • Smokeless tobacco: Never Used   Substance and Sexual Activity   • Alcohol use: No   • Drug use: No   • Sexual activity: Yes     Partners: Male         ALLERGIES  Patient has no known allergies.        REVIEW OF SYSTEMS  Review of Systems     All systems reviewed and negative except for those discussed in HPI.       PHYSICAL EXAM    I have reviewed the triage vital signs and nursing notes.    ED Triage Vitals [04/20/22 2033]   Temp Heart Rate Resp BP SpO2   100.3 °F (37.9 °C) 108 16 136/93 98 %      Temp src Heart Rate Source Patient Position BP Location FiO2 (%)   Tympanic Monitor Standing Right arm --       Physical Exam    Physical Exam   Constitutional: No distress.  Not overtly toxic  HENT:  Head: Normocephalic and atraumatic.   Oropharynx: Mucous membranes are moist.   Eyes: No scleral icterus. No conjunctival pallor.  Neck: Painless range of motion noted. Neck supple.   Cardiovascular: Tachycardic rate, regular rhythm and intact distal pulses.  Pulmonary/Chest: No respiratory distress. There are no wheezes, no rhonchi, and no rales.   Abdominal: Soft. There is mild suprapubic discomfort with palpation without rebound or rigidity.   Musculoskeletal: Moves all extremities equally. There is no pedal edema or calf tenderness.   Neurological: Alert.  Baseline strength and sensation noted.   Skin: Skin is pink, warm, and dry. No pallor.   Psychiatric: Mood and affect normal.   Nursing note and vitals reviewed.    LAB RESULTS  Recent Results (from the past 24 hour(s))   Urinalysis With Microscopic If Indicated  (No Culture) - Urine, Clean Catch    Collection Time: 04/20/22 10:54 PM    Specimen: Urine, Clean Catch   Result Value Ref Range    Color, UA Yellow Yellow, Straw    Appearance, UA Clear Clear    pH, UA 7.5 5.0 - 8.0    Specific Gravity, UA 1.023 1.005 - 1.030    Glucose, UA Negative Negative    Ketones, UA Negative Negative    Bilirubin, UA Negative Negative    Blood, UA Negative Negative    Protein, UA Negative Negative    Leuk Esterase, UA Negative Negative    Nitrite, UA Negative Negative    Urobilinogen, UA 1.0 E.U./dL 0.2 - 1.0 E.U./dL   Comprehensive Metabolic Panel    Collection Time: 04/20/22 11:01 PM    Specimen: Blood   Result Value Ref Range    Glucose 82 65 - 99 mg/dL    BUN 9 6 - 20 mg/dL    Creatinine 0.65 0.57 - 1.00 mg/dL    Sodium 137 136 - 145 mmol/L    Potassium 3.4 (L) 3.5 - 5.2 mmol/L    Chloride 104 98 - 107 mmol/L    CO2 22.0 22.0 - 29.0 mmol/L    Calcium 8.9 8.6 - 10.5 mg/dL    Total Protein 6.9 6.0 - 8.5 g/dL    Albumin 4.50 3.50 - 5.20 g/dL    ALT (SGPT) 12 1 - 33 U/L    AST (SGOT) 15 1 - 32 U/L    Alkaline Phosphatase 45 39 - 117 U/L    Total Bilirubin 0.4 0.0 - 1.2 mg/dL    Globulin 2.4 gm/dL    A/G Ratio 1.9 g/dL    BUN/Creatinine Ratio 13.8 7.0 - 25.0    Anion Gap 11.0 5.0 - 15.0 mmol/L    eGFR 127.1 >60.0 mL/min/1.73   hCG, Quantitative, Pregnancy    Collection Time: 04/20/22 11:01 PM    Specimen: Blood   Result Value Ref Range    HCG Quantitative 1,742.00 mIU/mL   Lipase    Collection Time: 04/20/22 11:01 PM    Specimen: Blood   Result Value Ref Range    Lipase 24 13 - 60 U/L   CBC Auto Differential    Collection Time: 04/20/22 11:01 PM    Specimen: Blood   Result Value Ref Range    WBC 9.58 3.40 - 10.80 10*3/mm3    RBC 4.13 3.77 - 5.28 10*6/mm3    Hemoglobin 12.6 12.0 - 15.9 g/dL    Hematocrit 36.9 34.0 - 46.6 %    MCV 89.3 79.0 - 97.0 fL    MCH 30.5 26.6 - 33.0 pg    MCHC 34.1 31.5 - 35.7 g/dL    RDW 11.8 (L) 12.3 - 15.4 %    RDW-SD 37.8 37.0 - 54.0 fl    MPV 10.1 6.0 - 12.0 fL     Platelets 210 140 - 450 10*3/mm3    Neutrophil % 70.6 42.7 - 76.0 %    Lymphocyte % 22.8 19.6 - 45.3 %    Monocyte % 4.8 (L) 5.0 - 12.0 %    Eosinophil % 1.1 0.3 - 6.2 %    Basophil % 0.4 0.0 - 1.5 %    Immature Grans % 0.3 0.0 - 0.5 %    Neutrophils, Absolute 6.76 1.70 - 7.00 10*3/mm3    Lymphocytes, Absolute 2.18 0.70 - 3.10 10*3/mm3    Monocytes, Absolute 0.46 0.10 - 0.90 10*3/mm3    Eosinophils, Absolute 0.11 0.00 - 0.40 10*3/mm3    Basophils, Absolute 0.04 0.00 - 0.20 10*3/mm3    Immature Grans, Absolute 0.03 0.00 - 0.05 10*3/mm3    nRBC 0.0 0.0 - 0.2 /100 WBC   COVID-19 and FLU A/B PCR - Swab, Nasopharynx    Collection Time: 04/20/22 11:03 PM    Specimen: Nasopharynx; Swab   Result Value Ref Range    COVID19 Not Detected Not Detected - Ref. Range    Influenza A PCR Not Detected Not Detected    Influenza B PCR Not Detected Not Detected       Ordered the above labs and independently reviewed the results.        RADIOLOGY  US Ob Limited 1 + Fetuses    Result Date: 4/21/2022  PELVIC ULTRASOUND  HISTORY: Pain for 2 weeks.  COMPARISON: None available.  TECHNIQUE: Grayscale, color Doppler, spectral Doppler waveform analysis was performed through the pelvis transabdominally and transvaginally.  FINDINGS: Uterus measures 7.3 x 4.4 x 4.5 cm. Endometrium measures about 1.1 cm. There is a potential gestational sac identified within the endometrial canal. No fetal pole or yolk sac is seen within it. Right ovary measures 2.2 x 1.4 x 1.3 cm. Left ovary measures 4.5 x 1.9 x 1.9 cm. Normal-appearing follicles are seen on the right ovary, and normal color Doppler flow is noted within the right ovary. The left ovary does measure larger than the right, uncertain clinical significance. However, it does demonstrate normal color Doppler flow. Left ovary to measure larger on a prior ultrasound from 02/05/2021, and this may represent a variant of normal. Trace free fluid is seen within the cul-de-sac. No suspicious adnexal masses are  seen.       1. There is a hypoechoic structure within the endometrial canal, which could represent a gestational sac. This would correspond to an estimated gestational age of 4 weeks and 6 days. Unfortunately, patient's last known menstrual period is not known, and the quantitative beta hCG is not available at this time. FINDINGS on today's exam may represent very early pregnancy. However, ectopic pregnancy cannot be excluded at this time. Of note, no suspicious adnexal masses are seen. Continued obstetric and sonographic follow-up is recommended, as is attention to serial beta hCGs. 2. There is a size discrepancy between the left and right ovaries. Clinical significance is uncertain. Normal color Doppler flow is seen in both ovaries. Patient did appear to have a slight size discrepancy on prior exam from 02/05/2021, and potentially, this may represent a normal variant.  This report was finalized on 4/21/2022 12:50 AM by Dr. Sinai Sun M.D.        I ordered the above noted radiological studies. Reviewed by me and discussed with radiologist.  See dictation for official radiology interpretation.      PROCEDURES    Procedures        MEDICATIONS GIVEN IN ER    Medications   sodium chloride 0.9 % flush 10 mL (has no administration in time range)   sodium chloride 0.9 % bolus 1,000 mL (1,000 mL Intravenous New Bag 4/20/22 5832)   acetaminophen (TYLENOL) tablet 1,000 mg (1,000 mg Oral Given 4/20/22 0027)         PROGRESS, DATA ANALYSIS, CONSULTS, AND MEDICAL DECISION MAKING    My differential diagnosis for abdominal pain includes but is not limited to:  Gastritis, gastroenteritis, peptic ulcer disease, GERD, esophageal perforation, acute appendicitis, mesenteric adenitis, Meckel’s diverticulum, epiploic appendagitis, diverticulitis, colon cancer, ulcerative colitis, Crohn’s disease, intussusception, small bowel obstruction, adhesions, ischemic bowel, perforated viscus, ileus, obstipation, biliary colic,  cholecystitis, cholelithiasis, Bart-Danie Andrea, hepatitis, pancreatitis, common bile duct obstruction, cholangitis, bile leak, splenic trauma, splenic rupture, splenic infarction, splenic abscess, abdominal abscess, ascites, spontaneous bacterial peritonitis, hernia, UTI, cystitis,ureterolithiasis, urinary obstruction, ovarian cyst, torsion, pregnancy, ectopic pregnancy, PID, pelvic abscess, mittelschmerz, endometriosis, AAA, myocardial infarction, pneumonia, cancer, porphyria, DKA, medications, sickle cell, viral syndrome, zoster      All labs have been independently reviewed by me.  All radiology studies have been reviewed by me and discussed with radiologist dictating the report.   EKG's independently viewed and interpreted by me.  Discussion below represents my analysis of pertinent findings related to patient's condition, differential diagnosis, treatment plan and final disposition.      ED Course as of 04/21/22 0201   Wed Apr 20, 2022 2331 COVID19: Not Detected [RS]   2331 Influenza A PCR: Not Detected [RS]   2331 Influenza B PCR: Not Detected [RS]   2331 WBC: 9.58 [RS]   2331 Hemoglobin: 12.6 [RS]   2331 Platelets: 210 [RS]   2331 Nitrite, UA: Negative [RS]   2331 Leukocytes, UA: Negative [RS]   2350 Lipase: 24 [RS]   2350 BUN: 9 [RS]   2350 Creatinine: 0.65 [RS]   2350 Sodium: 137 [RS]   2350 Potassium(!): 3.4 [RS]   2350 Total Bilirubin: 0.4 [RS]   2350 AST (SGOT): 15 [RS]   2350 ALT (SGPT): 12 [RS]   Thu Apr 21, 2022   0149 HCG Quantitative: 1,742.00 [RS]   0159 Patient feeling somewhat improved after fluids and Tylenol.  Vital signs improved.  Repeat examination finds patient nontoxic with continued nonsurgical abdomen.  We discussed lab findings, ultrasound result, and need for close outpatient follow-up with OB/GYN.  We discussed red flags which would indicate need for ED return.  Patient expresses understanding agreement discharge planning at this time. [RS]      ED Course User Index  [RS] Alexandru  Kameron CHOU MD       AS OF 02:01 EDT VITALS:    BP - 117/63  HR - 72  TEMP - 100.3 °F (37.9 °C) (Tympanic)  O2 SATS - 99%        DIAGNOSIS  Final diagnoses:   Fever in adult   Abdominal cramping   First trimester pregnancy         DISPOSITION  DISCHARGE    Patient discharged in stable condition.    Reviewed implications of results, diagnosis, meds, responsibility to follow up, warning signs and symptoms of possible worsening, potential complications and reasons to return to ER.    Patient/Family voiced understanding of above instructions.    Discussed plan for discharge, as there is no emergent indication for admission. Patient referred to primary care provider for regular health maintenance. Pt/family is agreeable and understands need for follow up and possible repeat testing.  Pt is aware that discharge does not mean that nothing is wrong but it indicates no emergency is present that requires admission and they must continue care with follow-up as given below or physician of their choice.     FOLLOW-UP  Angie Morton, APRN  [address]  [facility] [address]  [phone]    Schedule an appointment as soon as possible for a visit   As needed    Cezar Peterson MD  [address]  [facility] [address]  [phone]    Schedule an appointment as soon as possible for a visit in 1 week           Medication List      Stop    norgestimate-ethinyl estradiol 0.25-35 MG-MCG per tablet  Commonly known as: Kameron Cosme MD  04/21/22 0207

## 2022-05-03 ENCOUNTER — TELEPHONE (OUTPATIENT)
Dept: OBSTETRICS AND GYNECOLOGY | Facility: CLINIC | Age: 22
End: 2022-05-03

## 2022-05-03 NOTE — TELEPHONE ENCOUNTER
Pt is approximately 6 weeks pregnant and says that for the past few weeks she has had a low grade fever,hemhoraging and nausea. She was seen in the ER. Notes are in chart. What do you advise?     Thanks,  Horacio

## 2022-06-27 ENCOUNTER — HOSPITAL ENCOUNTER (EMERGENCY)
Facility: HOSPITAL | Age: 22
Discharge: HOME | End: 2022-06-27
Attending: EMERGENCY MEDICINE | Admitting: EMERGENCY MEDICINE

## 2022-06-27 ENCOUNTER — APPOINTMENT (OUTPATIENT)
Dept: ULTRASOUND IMAGING | Facility: HOSPITAL | Age: 22
End: 2022-06-27

## 2022-06-27 ENCOUNTER — TELEPHONE (OUTPATIENT)
Dept: OBSTETRICS AND GYNECOLOGY | Facility: CLINIC | Age: 22
End: 2022-06-27

## 2022-06-27 VITALS
RESPIRATION RATE: 16 BRPM | SYSTOLIC BLOOD PRESSURE: 135 MMHG | DIASTOLIC BLOOD PRESSURE: 92 MMHG | TEMPERATURE: 98.3 F | HEART RATE: 94 BPM | OXYGEN SATURATION: 98 %

## 2022-06-27 DIAGNOSIS — R10.2 PELVIC PAIN: ICD-10-CM

## 2022-06-27 DIAGNOSIS — N93.8 DYSFUNCTIONAL UTERINE BLEEDING: Primary | ICD-10-CM

## 2022-06-27 LAB
ABO GROUP BLD: NORMAL
ALBUMIN SERPL-MCNC: 4.1 G/DL (ref 3.5–5.2)
ALBUMIN/GLOB SERPL: 1.5 G/DL
ALP SERPL-CCNC: 36 U/L (ref 39–117)
ALT SERPL W P-5'-P-CCNC: 11 U/L (ref 1–33)
ANION GAP SERPL CALCULATED.3IONS-SCNC: 14 MMOL/L (ref 5–15)
APTT PPP: 29 SECONDS (ref 22.7–35.4)
AST SERPL-CCNC: 13 U/L (ref 1–32)
BASOPHILS # BLD AUTO: 0.05 10*3/MM3 (ref 0–0.2)
BASOPHILS NFR BLD AUTO: 0.8 % (ref 0–1.5)
BILIRUB SERPL-MCNC: 0.2 MG/DL (ref 0–1.2)
BUN SERPL-MCNC: 8 MG/DL (ref 6–20)
BUN/CREAT SERPL: 11.6 (ref 7–25)
CALCIUM SPEC-SCNC: 9.1 MG/DL (ref 8.6–10.5)
CHLORIDE SERPL-SCNC: 103 MMOL/L (ref 98–107)
CLUE CELLS SPEC QL WET PREP: ABNORMAL
CO2 SERPL-SCNC: 23 MMOL/L (ref 22–29)
CREAT SERPL-MCNC: 0.69 MG/DL (ref 0.57–1)
DEPRECATED RDW RBC AUTO: 37.8 FL (ref 37–54)
EGFRCR SERPLBLD CKD-EPI 2021: 125.2 ML/MIN/1.73
EOSINOPHIL # BLD AUTO: 0.07 10*3/MM3 (ref 0–0.4)
EOSINOPHIL NFR BLD AUTO: 1.1 % (ref 0.3–6.2)
ERYTHROCYTE [DISTWIDTH] IN BLOOD BY AUTOMATED COUNT: 11.7 % (ref 12.3–15.4)
GLOBULIN UR ELPH-MCNC: 2.7 GM/DL
GLUCOSE SERPL-MCNC: 80 MG/DL (ref 65–99)
HCG INTACT+B SERPL-ACNC: <1 MIU/ML
HCT VFR BLD AUTO: 34.2 % (ref 34–46.6)
HGB BLD-MCNC: 12 G/DL (ref 12–15.9)
HYDATID CYST SPEC WET PREP: ABNORMAL
IMM GRANULOCYTES # BLD AUTO: 0.02 10*3/MM3 (ref 0–0.05)
IMM GRANULOCYTES NFR BLD AUTO: 0.3 % (ref 0–0.5)
INR PPP: 1.09 (ref 0.9–1.1)
KOH PREP NAIL: NORMAL
LYMPHOCYTES # BLD AUTO: 2.4 10*3/MM3 (ref 0.7–3.1)
LYMPHOCYTES NFR BLD AUTO: 37.1 % (ref 19.6–45.3)
MCH RBC QN AUTO: 31.7 PG (ref 26.6–33)
MCHC RBC AUTO-ENTMCNC: 35.1 G/DL (ref 31.5–35.7)
MCV RBC AUTO: 90.2 FL (ref 79–97)
MONOCYTES # BLD AUTO: 0.52 10*3/MM3 (ref 0.1–0.9)
MONOCYTES NFR BLD AUTO: 8 % (ref 5–12)
NEUTROPHILS NFR BLD AUTO: 3.41 10*3/MM3 (ref 1.7–7)
NEUTROPHILS NFR BLD AUTO: 52.7 % (ref 42.7–76)
NRBC BLD AUTO-RTO: 0 /100 WBC (ref 0–0.2)
PLATELET # BLD AUTO: 215 10*3/MM3 (ref 140–450)
PMV BLD AUTO: 10.5 FL (ref 6–12)
POTASSIUM SERPL-SCNC: 3.1 MMOL/L (ref 3.5–5.2)
PROT SERPL-MCNC: 6.8 G/DL (ref 6–8.5)
PROTHROMBIN TIME: 14 SECONDS (ref 11.7–14.2)
RBC # BLD AUTO: 3.79 10*6/MM3 (ref 3.77–5.28)
RH BLD: NEGATIVE
SODIUM SERPL-SCNC: 140 MMOL/L (ref 136–145)
T VAGINALIS SPEC QL WET PREP: ABNORMAL
WBC NRBC COR # BLD: 6.47 10*3/MM3 (ref 3.4–10.8)
WBC SPEC QL WET PREP: ABNORMAL
YEAST GENITAL QL WET PREP: ABNORMAL

## 2022-06-27 PROCEDURE — 86900 BLOOD TYPING SEROLOGIC ABO: CPT | Performed by: EMERGENCY MEDICINE

## 2022-06-27 PROCEDURE — 87220 TISSUE EXAM FOR FUNGI: CPT | Performed by: EMERGENCY MEDICINE

## 2022-06-27 PROCEDURE — 99283 EMERGENCY DEPT VISIT LOW MDM: CPT

## 2022-06-27 PROCEDURE — 76856 US EXAM PELVIC COMPLETE: CPT

## 2022-06-27 PROCEDURE — 85730 THROMBOPLASTIN TIME PARTIAL: CPT | Performed by: EMERGENCY MEDICINE

## 2022-06-27 PROCEDURE — 84702 CHORIONIC GONADOTROPIN TEST: CPT | Performed by: EMERGENCY MEDICINE

## 2022-06-27 PROCEDURE — 85025 COMPLETE CBC W/AUTO DIFF WBC: CPT | Performed by: EMERGENCY MEDICINE

## 2022-06-27 PROCEDURE — 86901 BLOOD TYPING SEROLOGIC RH(D): CPT | Performed by: EMERGENCY MEDICINE

## 2022-06-27 PROCEDURE — 85610 PROTHROMBIN TIME: CPT | Performed by: EMERGENCY MEDICINE

## 2022-06-27 PROCEDURE — 87210 SMEAR WET MOUNT SALINE/INK: CPT | Performed by: EMERGENCY MEDICINE

## 2022-06-27 PROCEDURE — 80053 COMPREHEN METABOLIC PANEL: CPT | Performed by: EMERGENCY MEDICINE

## 2022-06-27 RX ORDER — IBUPROFEN 600 MG/1
600 TABLET ORAL 3 TIMES DAILY
Qty: 9 TABLET | Refills: 0 | Status: SHIPPED | OUTPATIENT
Start: 2022-06-27 | End: 2022-06-30

## 2022-06-27 NOTE — TELEPHONE ENCOUNTER
Caller: Nannette Godinez    Relationship to patient: Self    Best call back number: [phone]    Patient is needing:   PT IS REQUESTING A CALLBACK FOR AN APPT. PT TOOK  PILL ABOUT 6 WEEKS. PT IS EXPERIENCING HEAVY BLEEDING AND CLOTTING, SEVERE PAIN AND A LOW-GRADE FEVER. PT STATED SYMPTOMS STARTED ON 22. PT STATED SHE WAS BLEEDING THROUGH MORE THAN 1 PAD/ HOUR ON 22- 22. PT IS NO LONGER BLEEDING THROUGH MORE THAN 1 PAD/HR AND NO LONGER HAS A LOW GRADE FEVER BUT IS STILL CLOTTING BADLY AND IS STILL IN PAIN. PLEASE CALL PT BACK [phone].

## 2022-06-27 NOTE — ED TRIAGE NOTES
"All triage performed with this RN wearing appropriate PPE.  Pt placed in mask upon arrival to ED.  Patient c/o heavy vaginal bleeding with clotting since Friday. She reports taking the \" pill\" May 14.   " Placed referral for ID . Will need apt to check BP and discuss meds.

## 2022-06-27 NOTE — TELEPHONE ENCOUNTER
Due to patient's heavy bleeding especially due to the question of fever she needs  to go to the [facility] emergency room immediately to be evaluated for infection.  We are not able to do that in the office. It would be dangerous for her to wait for an appointment to come in this week.[email or web address]

## 2022-06-27 NOTE — ED PROVIDER NOTES
EMERGENCY DEPARTMENT ENCOUNTER    Room Number:    Date of encounter:  2022  PCP: Angie Morton APRN  Historian: Patient    Patient was placed in face mask during triage process. Patient was wearing facemask when I entered the room and throughout our encounter. I wore full protective equipment throughout this patient encounter including a face mask, eye protection, and gloves. Hand hygiene was performed before donning protective equipment and again following doffing of PPE after leaving the room.    HPI:  Chief Complaint: Vaginal bleeding and pelvic pain  A complete HPI/ROS/PMH/PSH/SH/FH are unobtainable due to: N/A   Context: Nannette Godinez is a 22 y.o. female  2 para 1 who presents to the ED c/o mild pelvic cramping and heavy vaginal bleeding for the last 3 days.  Patient reports that she has irregular menstrual cycles at baseline and does not recall when her last normal menstrual period was but ultimately want a pregnant, had an ultrasound that showed her 8 weeks, and requested  for which she took medication in the middle of May.  She had 2 weeks of bleeding after which it stopped.  She had what she felt was normal menstrual cycle to beginning of last week that concluded on Thursday but then she awakened on Friday with bleeding and cramping again.  She reports mild crampy discomfort at this time.  No nausea, vomiting, chest pain or syncope.  Patient is back on her birth control medication but is sexually active.  Patient reports that she was seen prior to her chemical  at another facility and received her RhoGAM.    MEDICAL HISTORY REVIEW  EMR reviewed:    Patient noted to be O- blood type previously    PAST MEDICAL HISTORY  Active Ambulatory Problems     Diagnosis Date Noted   • Primigravida 2019   • Depression, major, recurrent (HCC) 2013   • History of  premature rupture of membranes (PPROM) 2019     Resolved Ambulatory Problems     Diagnosis Date  Noted   • Pregnancy 2019   •  labor 2019   •  premature rupture of membranes (PPROM) with unknown onset of labor 2019     Past Medical History:   Diagnosis Date   • Deliberate self-cutting          PAST SURGICAL HISTORY  Past Surgical History:   Procedure Laterality Date   • WISDOM TOOTH EXTRACTION           FAMILY HISTORY  Family History   Problem Relation Age of Onset   • Hypertension Paternal Uncle          SOCIAL HISTORY  Social History     Socioeconomic History   • Marital status: Single   Tobacco Use   • Smoking status: Current Every Day Smoker     Packs/day: 0.00     Types: Cigarettes   • Smokeless tobacco: Never Used   Substance and Sexual Activity   • Alcohol use: No   • Drug use: No   • Sexual activity: Yes     Partners: Male         ALLERGIES  Patient has no known allergies.        REVIEW OF SYSTEMS  Review of Systems     All systems reviewed and negative except for those discussed in HPI.       PHYSICAL EXAM    I have reviewed the triage vital signs and nursing notes.    ED Triage Vitals [22 1757]   Temp Heart Rate Resp BP SpO2   98.3 °F (36.8 °C) 105 16 128/84 100 %      Temp src Heart Rate Source Patient Position BP Location FiO2 (%)   Tympanic Monitor -- -- --       Physical Exam    Physical Exam   Constitutional: No distress.  Tearful but not overtly toxic.  HENT:  Head: Normocephalic and atraumatic.   Oropharynx: Mucous membranes are moist.   Eyes: No scleral icterus. No conjunctival pallor.  Neck: Painless range of motion noted. Neck supple.   Cardiovascular: Normal rate, regular rhythm and intact distal pulses.  Pulmonary/Chest: No respiratory distress. There are no wheezes, no rhonchi, and no rales.   Abdominal: Soft. There is minimal suprapubic tenderness.  No rigidity there is no rebound and no guarding.   Musculoskeletal: Moves all extremities equally. There is no pedal edema or calf tenderness.   Neurological: Alert.  Baseline strength and sensation  noted.   Skin: Skin is pink, warm, and dry. No pallor.   Psychiatric: Mood and affect normal.   Nursing note and vitals reviewed.    LAB RESULTS  Recent Results (from the past 24 hour(s))   Comprehensive Metabolic Panel    Collection Time: 06/27/22  8:04 PM    Specimen: Blood   Result Value Ref Range    Glucose 80 65 - 99 mg/dL    BUN 8 6 - 20 mg/dL    Creatinine 0.69 0.57 - 1.00 mg/dL    Sodium 140 136 - 145 mmol/L    Potassium 3.1 (L) 3.5 - 5.2 mmol/L    Chloride 103 98 - 107 mmol/L    CO2 23.0 22.0 - 29.0 mmol/L    Calcium 9.1 8.6 - 10.5 mg/dL    Total Protein 6.8 6.0 - 8.5 g/dL    Albumin 4.10 3.50 - 5.20 g/dL    ALT (SGPT) 11 1 - 33 U/L    AST (SGOT) 13 1 - 32 U/L    Alkaline Phosphatase 36 (L) 39 - 117 U/L    Total Bilirubin 0.2 0.0 - 1.2 mg/dL    Globulin 2.7 gm/dL    A/G Ratio 1.5 g/dL    BUN/Creatinine Ratio 11.6 7.0 - 25.0    Anion Gap 14.0 5.0 - 15.0 mmol/L    eGFR 125.2 >60.0 mL/min/1.73   Protime-INR    Collection Time: 06/27/22  8:04 PM    Specimen: Blood   Result Value Ref Range    Protime 14.0 11.7 - 14.2 Seconds    INR 1.09 0.90 - 1.10   aPTT    Collection Time: 06/27/22  8:04 PM    Specimen: Blood   Result Value Ref Range    PTT 29.0 22.7 - 35.4 seconds   hCG, Quantitative, Pregnancy    Collection Time: 06/27/22  8:04 PM    Specimen: Blood   Result Value Ref Range    HCG Quantitative <1.00 mIU/mL   ABO / Rh    Collection Time: 06/27/22  8:04 PM    Specimen: Blood   Result Value Ref Range    ABO Type O     RH type Negative    CBC Auto Differential    Collection Time: 06/27/22  8:04 PM    Specimen: Blood   Result Value Ref Range    WBC 6.47 3.40 - 10.80 10*3/mm3    RBC 3.79 3.77 - 5.28 10*6/mm3    Hemoglobin 12.0 12.0 - 15.9 g/dL    Hematocrit 34.2 34.0 - 46.6 %    MCV 90.2 79.0 - 97.0 fL    MCH 31.7 26.6 - 33.0 pg    MCHC 35.1 31.5 - 35.7 g/dL    RDW 11.7 (L) 12.3 - 15.4 %    RDW-SD 37.8 37.0 - 54.0 fl    MPV 10.5 6.0 - 12.0 fL    Platelets 215 140 - 450 10*3/mm3    Neutrophil % 52.7 42.7 - 76.0 %     Lymphocyte % 37.1 19.6 - 45.3 %    Monocyte % 8.0 5.0 - 12.0 %    Eosinophil % 1.1 0.3 - 6.2 %    Basophil % 0.8 0.0 - 1.5 %    Immature Grans % 0.3 0.0 - 0.5 %    Neutrophils, Absolute 3.41 1.70 - 7.00 10*3/mm3    Lymphocytes, Absolute 2.40 0.70 - 3.10 10*3/mm3    Monocytes, Absolute 0.52 0.10 - 0.90 10*3/mm3    Eosinophils, Absolute 0.07 0.00 - 0.40 10*3/mm3    Basophils, Absolute 0.05 0.00 - 0.20 10*3/mm3    Immature Grans, Absolute 0.02 0.00 - 0.05 10*3/mm3    nRBC 0.0 0.0 - 0.2 /100 WBC   Wet Prep, Genital - Swab, Vagina    Collection Time: 06/27/22  9:30 PM    Specimen: Vagina; Swab   Result Value Ref Range    YEAST No yeast seen No yeast seen    HYPHAL ELEMENTS No Hyphal elements seen No Hyphal elements seen    WBC'S 1+ WBC's seen (A) No WBC's seen    Clue Cells, Wet Prep No Clue cells seen No Clue cells seen    Trichomonas, Wet Prep No Trichomonas seen No Trichomonas seen   KOH Prep - Swab, Vagina    Collection Time: 06/27/22  9:30 PM    Specimen: Vagina; Swab   Result Value Ref Range    KOH Prep No yeast or hyphal elements seen No yeast or hyphal elements seen       Ordered the above labs and independently reviewed the results.        RADIOLOGY  US Pelvis Complete    Result Date: 6/27/2022  PELVIC ULTRASOUND  HISTORY: Vaginal bleeding  COMPARISON: 05/04/2022  TECHNIQUE: Grayscale, color Doppler, spectral Doppler waveform analysis performed transabdominally only. Patient declined transvaginal imaging.  FINDINGS: Uterus measures 7.4 x 4.1 x 5.7 cm. Endometrium measures within normal limits at 4 mm. Uterus is normal in echotexture. Right ovary measures 4.1 x 2.0 x 1.7 cm. Left ovary measures 3.4 x 1.4 x 1.5 cm. Both ovaries appear grossly unremarkable, and normal color Doppler flow is seen. Patient does have a small amount of free fluid adjacent to the left ovary.      No evidence of retained products of conception.  This report was finalized on 6/27/2022 10:32 PM by Dr. Sinai Sun M.D.        I  ordered the above noted radiological studies. Reviewed by me and discussed with radiologist.  See dictation for official radiology interpretation.      PROCEDURES    Procedures        MEDICATIONS GIVEN IN ER    Medications - No data to display      PROGRESS, DATA ANALYSIS, CONSULTS, AND MEDICAL DECISION MAKING    My differential diagnosis for abdominal pain includes but is not limited to:  Gastritis, gastroenteritis, peptic ulcer disease, GERD, esophageal perforation, acute appendicitis, mesenteric adenitis, Meckel’s diverticulum, epiploic appendagitis, diverticulitis, colon cancer, ulcerative colitis, Crohn’s disease, intussusception, small bowel obstruction, adhesions, ischemic bowel, perforated viscus, ileus, obstipation, biliary colic, cholecystitis, cholelithiasis, Abrt-Danie Andrea, hepatitis, pancreatitis, common bile duct obstruction, cholangitis, bile leak, splenic trauma, splenic rupture, splenic infarction, splenic abscess, abdominal abscess, ascites, spontaneous bacterial peritonitis, hernia, UTI, cystitis,ureterolithiasis, urinary obstruction, ovarian cyst, torsion, pregnancy, ectopic pregnancy, PID, pelvic abscess, mittelschmerz, endometriosis, AAA, myocardial infarction, pneumonia, cancer, porphyria, DKA, medications, sickle cell, viral syndrome, zoster      All labs have been independently reviewed by me.  All radiology studies have been reviewed by me and discussed with radiologist dictating the report.   EKG's independently viewed and interpreted by me.  Discussion below represents my analysis of pertinent findings related to patient's condition, differential diagnosis, treatment plan and final disposition.      ED Course as of 06/27/22 2240   Mon Jun 27, 2022   2018 Patient does request a female for her pelvic exam.  Yael Goodwin PA-C is agreeable.  Patient was offered pain medication but declines at this time. [RS]   2207 HCG Quantitative: <1.00 [RS]   2207 BUN: 8 [RS]   2207 Creatinine: 0.69 [RS]    2207 WBC: 6.47 [RS]   2207 Hemoglobin: 12.0 [RS]   2229 Clue Cells, Wet Prep: No Clue cells seen [RS]   2229 Trichomonas, Wet Prep: No Trichomonas seen [RS]   2229 KOH Prep: No yeast or hyphal elements seen [RS]   2240 No acute life threat.  Reviewed all findings with patient and agreeable discharge planning. [RS]      ED Course User Index  [RS] Kameron Horvath MD       AS OF 22:40 EDT VITALS:    BP - 124/81  HR - 73  TEMP - 98.3 °F (36.8 °C) (Tympanic)  O2 SATS - 100%        DIAGNOSIS  Final diagnoses:   Dysfunctional uterine bleeding   Pelvic pain         DISPOSITION  DISCHARGE    Patient discharged in stable condition.    Reviewed implications of results, diagnosis, meds, responsibility to follow up, warning signs and symptoms of possible worsening, potential complications and reasons to return to ER.    Patient/Family voiced understanding of above instructions.    Discussed plan for discharge, as there is no emergent indication for admission. Patient referred to primary care provider for regular health maintenance. Pt/family is agreeable and understands need for follow up and possible repeat testing.  Pt is aware that discharge does not mean that nothing is wrong but it indicates no emergency is present that requires admission and they must continue care with follow-up as given below or physician of their choice.     FOLLOW-UP  Angie Morton, DENNY  [address] [facility] [address]  [facility] [id number] [phone]    Schedule an appointment as soon as possible for a visit   As needed    Cezar Peterson MD  [address] [facility] [address]  [facility] [id number] [phone]    Call in 1 day  Schedule close outpatient follow-up         Medication List      New Prescriptions    ibuprofen 600 MG tablet  Commonly known as: ADVIL,MOTRIN  Take 1 tablet by mouth 3 (Three) Times a Day for 3 days.           Where to Get Your Medications      These medications were sent to [facility] #[id number] - [place], [place] - [address] AT [facility] - [phone] PH - [phone] FX  [address], [facility] [address]    Phone: [phone]   · ibuprofen 600 MG tablet            Kameron Horvath MD  06/27/22 5078

## 2022-06-28 NOTE — ED PROVIDER NOTES
The patient requested a female perform her pelvic exam. I performed this part of the patient's evaluation and reported my findings to Dr. Horvath.     External genitalia is without lesions  The vaginal vault exhibits small amount of dark blood, no clots, no erythema or discharge.  The cervical os is closed.  No cervical motion tenderness, mild uterine tenderness, no adnexal tenderness.         Katelyn Goodwin PA  06/27/22 8415

## 2023-02-04 ENCOUNTER — TELEMEDICINE (OUTPATIENT)
Dept: FAMILY MEDICINE CLINIC | Facility: TELEHEALTH | Age: 23
End: 2023-02-04
Payer: COMMERCIAL

## 2023-02-04 DIAGNOSIS — J02.0 STREP THROAT: Primary | ICD-10-CM

## 2023-02-04 PROCEDURE — 99213 OFFICE O/P EST LOW 20 MIN: CPT | Performed by: NURSE PRACTITIONER

## 2023-02-04 RX ORDER — NORETHINDRONE ACETATE AND ETHINYL ESTRADIOL AND FERROUS FUMARATE 1MG-20(21)
KIT ORAL
COMMUNITY
Start: 2022-11-20

## 2023-02-04 RX ORDER — AMOXICILLIN 875 MG/1
875 TABLET, COATED ORAL 2 TIMES DAILY
Qty: 20 TABLET | Refills: 0 | Status: SHIPPED | OUTPATIENT
Start: 2023-02-04 | End: 2023-02-14

## 2023-02-04 NOTE — PROGRESS NOTES
You have chosen to receive care through a telehealth visit.  Do you consent to use a video/audio connection for your medical care today? Yes     CHIEF COMPLAINT  No chief complaint on file.        HPI  Nannette Godinez is a 22 y.o. female  presents with complaint of last night began running a fever 99.9 with sore throat.  She denies congestion, cough, shortness of breath.   Feels like strep throat as she has a history of this.      Review of Systems   See HPI    Past Medical History:   Diagnosis Date   • Deliberate self-cutting     Pt has old scars on both legs; no problems since 15       Family History   Problem Relation Age of Onset   • Hypertension Paternal Uncle        Social History     Socioeconomic History   • Marital status: Single   Tobacco Use   • Smoking status: Every Day     Packs/day: 0.00     Types: Cigarettes   • Smokeless tobacco: Never   Substance and Sexual Activity   • Alcohol use: No   • Drug use: No   • Sexual activity: Yes     Partners: Male       Nannette Godinez  reports that she has been smoking cigarettes. She has never used smokeless tobacco.              There were no vitals taken for this visit.    PHYSICAL EXAM  Physical Exam   Constitutional: She is oriented to person, place, and time. She appears well-developed and well-nourished. She does not have a sickly appearance. She does not appear ill.   HENT:   Head: Normocephalic and atraumatic.   Pulmonary/Chest: Effort normal.  No respiratory distress.  Neurological: She is alert and oriented to person, place, and time.         Diagnoses and all orders for this visit:    1. Strep throat (Primary)  -     amoxicillin (AMOXIL) 875 MG tablet; Take 1 tablet by mouth 2 (Two) Times a Day for 10 days.  Dispense: 20 tablet; Refill: 0    --take medications as prescribed  --increase fluids, rest as needed, tylenol or ibuprofen for pain  --f/u in 3-5 days if no improvement        FOLLOW-UP  As discussed during visit with PCP/Virtual Care if no improvement  or Urgent Care/Emergency Department if worsening of symptoms    Patient verbalizes understanding of medication dosage, comfort measures, instructions for treatment and follow-up.    Lydia Kasper, APRN  02/04/2023  13:44 EST    The use of a video visit has been reviewed with the patient and verbal informed consent has been obtained. Myself and Nannette Godinez participated in this visit. The patient is located in [address] [place] [address].    I am located in [place], [place]. [employer] and [employer] were utilized. I spent 8 minutes in the patient's chart for this visit.

## 2024-03-29 ENCOUNTER — E-VISIT (OUTPATIENT)
Dept: ADMINISTRATIVE | Facility: OTHER | Age: 24
End: 2024-03-29
Payer: COMMERCIAL

## 2024-03-29 NOTE — E-VISIT ESCALATED
Chief Complaint: Constipation   Patient was shown the following escalation message:   Some conditions need a visit with a healthcare provider   Because you've passed stool when you didn't mean to, you should speak with a provider to get care.   ----------   Patient Interview Transcript:   Which symptoms are you having? Select all that apply.    Feeling as though a blockage in my rectum is preventing a bowel movement    Feeling as though I can't completely empty my bowels    Abdominal pain or discomfort    Bloating    Swollen or distended abdomen   Not selected:    Fewer than 3 stools per week    Hard or lumpy stools    Stools that are too small    Straining with bowel movements   Which symptom is bothering you the most? Select one.    Abdominal pain or discomfort   Not selected:    Bloating    Swollen or distended abdomen    Fewer than 3 stools per week    Hard or lumpy stools    Stools that are too small    Straining with bowel movements    Feeling as though a blockage in my rectum is preventing a bowel movement    Feeling as though I can't completely empty my bowels   How long have you had your symptoms? Select one.    More than 3 months   Not selected:    Less than 1 week    1 to 2 weeks    2 to 4 weeks    More than 1 month but less than 3 months    I'm not sure   Are your symptoms constant, or do they come and go? For example, have you had normal stools in between the times you had hard stools? Are there times when you don't need to strain to have a bowel movement? Select one.    Come and go   Not selected:    Constant   When was the last time you passed any stool at all? Select one.    2 days ago   Not selected:    Today    Yesterday    3 days ago    More than 3 days ago   On average, how many times a week do you have a bowel movement? Select one.    3 times per week   Not selected:    Once a week    Twice a week    Every other day    Once a day    More than 1 time per day   How often do you feel the need or  urge to have a bowel movement? Select one.    Less than 3 times per week   Not selected:    Every other day    Once a day    More than once a day   When you feel the need, how often do you immediately try to have a bowel movement? Select one.    More than half the time   Not selected:    Always    Less than half the time    Rarely or never   When you feel the need and then try to have a bowel movement, how often are you able to pass stool? Report passing any stool, even if it's a small amount. Select one.    More than half the time   Not selected:    Always    Less than half the time    Rarely or never   When you try to have a bowel movement, how often do you feel as if you've completely emptied your bowels? Select one.    Rarely or never   Not selected:    Always    More than half the time    Less than half the time   Do you ever need help having a bowel movement, such as using a finger to remove the stool from your rectum? Select one.    No   Not selected:    Yes   How would you rate the abdominal pain? Select one.    Severe; it stops me from doing regular daily tasks   Not selected:    Mild; I only notice it when I pay attention to it    Moderate; it's uncomfortable, but I can still do regular daily tasks   Is the abdominal pain constant, or does it come and go? Select one.    Comes and goes   Not selected:    Constant   Is the abdominal pain getting better, getting worse, or staying the same? Select one.    Same   Not selected:    Better    Worse   Do any of these describe your bloating or swollen abdomen? Select all that apply.    My belly feels estes, tighter, or firmer than usual    I feel as if I need to pass gas, but I can't   Not selected:    My bloating and/or swollen abdomen symptoms have been getting worse    None of these   Have you ever had similar symptoms in the past? Select one.    Yes   Not selected:    No   Do your current symptoms feel similar to past symptoms? Select one.    Yes   Not  selected:    No   Since your symptoms began, have you passed or leaked stool when you didn't mean to? Select one.    Yes   Not selected:    No   ----------   Medical history   Medical history data does not currently exist for this patient.